# Patient Record
Sex: MALE | Race: WHITE | NOT HISPANIC OR LATINO | Employment: FULL TIME | URBAN - NONMETROPOLITAN AREA
[De-identification: names, ages, dates, MRNs, and addresses within clinical notes are randomized per-mention and may not be internally consistent; named-entity substitution may affect disease eponyms.]

---

## 2018-02-26 ENCOUNTER — DOCUMENTATION ONLY (OUTPATIENT)
Dept: FAMILY MEDICINE | Facility: OTHER | Age: 28
End: 2018-02-26

## 2018-02-26 PROBLEM — F41.1 ANXIETY STATE: Status: ACTIVE | Noted: 2018-02-26

## 2018-04-17 ENCOUNTER — OFFICE VISIT (OUTPATIENT)
Dept: FAMILY MEDICINE | Facility: OTHER | Age: 28
End: 2018-04-17
Attending: NURSE PRACTITIONER
Payer: COMMERCIAL

## 2018-04-17 VITALS
BODY MASS INDEX: 27.21 KG/M2 | SYSTOLIC BLOOD PRESSURE: 136 MMHG | HEIGHT: 67 IN | HEART RATE: 81 BPM | TEMPERATURE: 98.5 F | DIASTOLIC BLOOD PRESSURE: 80 MMHG | WEIGHT: 173.4 LBS

## 2018-04-17 DIAGNOSIS — K04.7 DENTAL INFECTION: Primary | ICD-10-CM

## 2018-04-17 PROCEDURE — 25000128 H RX IP 250 OP 636: Performed by: NURSE PRACTITIONER

## 2018-04-17 PROCEDURE — G0463 HOSPITAL OUTPT CLINIC VISIT: HCPCS | Mod: 25

## 2018-04-17 PROCEDURE — G0463 HOSPITAL OUTPT CLINIC VISIT: HCPCS

## 2018-04-17 PROCEDURE — 96372 THER/PROPH/DIAG INJ SC/IM: CPT

## 2018-04-17 PROCEDURE — 99213 OFFICE O/P EST LOW 20 MIN: CPT | Performed by: NURSE PRACTITIONER

## 2018-04-17 RX ORDER — KETOROLAC TROMETHAMINE 30 MG/ML
60 INJECTION, SOLUTION INTRAMUSCULAR; INTRAVENOUS ONCE
Status: COMPLETED | OUTPATIENT
Start: 2018-04-17 | End: 2018-04-17

## 2018-04-17 RX ORDER — PENICILLIN V POTASSIUM 500 MG/1
500 TABLET, FILM COATED ORAL 3 TIMES DAILY
Qty: 30 TABLET | Refills: 0 | Status: SHIPPED | OUTPATIENT
Start: 2018-04-17 | End: 2018-04-27

## 2018-04-17 RX ADMIN — KETOROLAC TROMETHAMINE 60 MG: 30 INJECTION, SOLUTION INTRAMUSCULAR at 13:08

## 2018-04-17 ASSESSMENT — PAIN SCALES - GENERAL: PAINLEVEL: EXTREME PAIN (8)

## 2018-04-17 ASSESSMENT — ENCOUNTER SYMPTOMS: FEVER: 0

## 2018-04-17 NOTE — PATIENT INSTRUCTIONS
"   *DENTAL PAIN    A crack or cavity in the tooth, which exposes the sensitive inner area of the tooth can cause tooth pain. An infection in the gum or the root of the tooth can cause pain and swelling. The pain is often made worse by drinking hot or cold fluids, or biting on hard foods. Pain may spread from the tooth to the ear or jaw on the same side.  HOME CARE:  1. Avoid hot and cold foods and liquids since your tooth may be sensitive to temperature changes.  2. If your tooth is chipped or cracked, or if there is a large open cavity, apply OIL OF CLOVES (available over-the-counter in drug stores) directly to the tooth to reduce pain. Some pharmacies carry an over-the-counter \"toothache kit.\" This contains a paste, which can be applied over the exposed tooth to decrease sensitivity. This is only a temporary solution. See a dentist as soon as possible to fix the tooth.  3. A cold pack on your jaw over the sore area may help reduce pain.  4. You may use acetaminophen (Tylenol) 650-1000 mg every 6 hours or ibuprofen (Motrin, Advil) 600 mg every 6-8 hours with food to control pain, if you are able to take these medicines. [ NOTE: If you have chronic liver or kidney disease or ever had a stomach ulcer or GI bleeding, talk with your doctor before using these medicines.]  5. If you have signs of an infection, an antibiotic will be given. Take it as directed.  FOLLOW-UP as directed with a dentist. Your pain may go away with the treatment given. However, only a dentist can fully evaluate and treat the cause and prevent the pain from coming back again.  TOOTHACHE IS A SIGN OF DISEASE IN YOUR TOOTH AND SHOULD BE EXAMINED AND TREATED BY A DENTIST.  GET PROMPT MEDICAL ATTENTION if any of the following occur:    Your face becomes swollen or red    Pain worsens or spreads to the neck    Fever over 101  F (38.3  C)    Unusual drowsiness; headache or stiff neck; weakness or fainting    Pus drains from the tooth    Difficulty " swallowing or breathing       8789-5464 [employer]. [address] [place] [address], [place] [address]. All rights reserved. This information is not intended as a substitute for professional medical care. Always follow your healthcare professional's instructions.  This information has been modified by your health care provider with permission from the publisher.

## 2018-04-17 NOTE — MR AVS SNAPSHOT
"              After Visit Summary   4/17/2018    Alexandr Guzmán    MRN: [id number]           Patient Information     Date Of Birth          1990        Visit Information        Provider Department      4/17/2018 12:15 PM Isabell Goodman APRN CNP [facility]        Today's Diagnoses     Dental infection    -  1      Care Instructions       *DENTAL PAIN    A crack or cavity in the tooth, which exposes the sensitive inner area of the tooth can cause tooth pain. An infection in the gum or the root of the tooth can cause pain and swelling. The pain is often made worse by drinking hot or cold fluids, or biting on hard foods. Pain may spread from the tooth to the ear or jaw on the same side.  HOME CARE:  1. Avoid hot and cold foods and liquids since your tooth may be sensitive to temperature changes.  2. If your tooth is chipped or cracked, or if there is a large open cavity, apply OIL OF CLOVES (available over-the-counter in drug stores) directly to the tooth to reduce pain. Some pharmacies carry an over-the-counter \"toothache kit.\" This contains a paste, which can be applied over the exposed tooth to decrease sensitivity. This is only a temporary solution. See a dentist as soon as possible to fix the tooth.  3. A cold pack on your jaw over the sore area may help reduce pain.  4. You may use acetaminophen (Tylenol) 650-1000 mg every 6 hours or ibuprofen (Motrin, Advil) 600 mg every 6-8 hours with food to control pain, if you are able to take these medicines. [ NOTE: If you have chronic liver or kidney disease or ever had a stomach ulcer or GI bleeding, talk with your doctor before using these medicines.]  5. If you have signs of an infection, an antibiotic will be given. Take it as directed.  FOLLOW-UP as directed with a dentist. Your pain may go away with the treatment given. However, only a dentist can fully evaluate and treat the cause and prevent the pain from coming back " "again.  TOOTHACHE IS A SIGN OF DISEASE IN YOUR TOOTH AND SHOULD BE EXAMINED AND TREATED BY A DENTIST.  GET PROMPT MEDICAL ATTENTION if any of the following occur:    Your face becomes swollen or red    Pain worsens or spreads to the neck    Fever over 101  F (38.3  C)    Unusual drowsiness; headache or stiff neck; weakness or fainting    Pus drains from the tooth    Difficulty swallowing or breathing       1410-3214 [employer]. [address] [place] [address], [place] [address]. All rights reserved. This information is not intended as a substitute for professional medical care. Always follow your healthcare professional's instructions.  This information has been modified by your health care provider with permission from the publisher.            Follow-ups after your visit        Who to contact     If you have questions or need follow up information about today's clinic visit or your schedule please contact [facility] directly at [phone].  Normal or non-critical lab and imaging results will be communicated to you by MyChart, letter or phone within 4 business days after the clinic has received the results. If you do not hear from us within 7 days, please contact the clinic through [facility] or phone. If you have a critical or abnormal lab result, we will notify you by phone as soon as possible.  Submit refill requests through [employer] or call your pharmacy and they will forward the refill request to us. Please allow 3 business days for your refill to be completed.          Additional Information About Your Visit        Care EveryWhere ID     This is your Care EveryWhere ID. This could be used by other organizations to access your [facility] medical records  [id number]        Your Vitals Were     Pulse Temperature Height BMI (Body Mass Index)          81 98.5  F (36.9  C) (Tympanic) 5' 6.93\" (1.7 m) 27.22 kg/m2         Blood Pressure from Last 3 Encounters:   04/17/18 136/80    Weight " from Last 3 Encounters:   04/17/18 173 lb 6.4 oz (78.7 kg)              Today, you had the following     No orders found for display         Today's Medication Changes          These changes are accurate as of 4/17/18  1:01 PM.  If you have any questions, ask your nurse or doctor.               Start taking these medicines.        Dose/Directions    penicillin V potassium 500 MG tablet   Commonly known as:  VEETID   Used for:  Dental infection   Started by:  Isabell Goodman APRN CNP        Dose:  500 mg   Take 1 tablet (500 mg) by mouth 3 times daily for 10 days   Quantity:  30 tablet   Refills:  0            Where to get your medicines      These medications were sent to [id number] - [place], [place] - [address], [facility] [address]     Phone:  [phone]     penicillin V potassium 500 MG tablet                Primary Care Provider Fax #    Physician No Ref-Primary [phone]       No address on file        Equal Access to Services     [facility] : Hadii sammy dobbins hadasho Soomaali, waaxda luqadaha, qaybta kaalmada adeegyada, waxay lily jenkins . So [facility] [phone].    ATENCIÓN: Si habla español, tiene a gay disposición servicios gratuitos de asistencia lingüística. Llame al [phone].    We comply with applicable federal civil rights laws and [place] laws. We do not discriminate on the basis of race, color, national origin, age, disability, sex, sexual orientation, or gender identity.            Thank you!     Thank you for choosing [facility]  for your care. Our goal is always to provide you with excellent care. Hearing back from our patients is one way we can continue to improve our services. Please take a few minutes to complete the written survey that you may receive in the mail after your visit with us. Thank you!             Your Updated Medication List - Protect others around  you: Learn how to safely use, store and throw away your medicines at www.disposemymeds.org.          This list is accurate as of 4/17/18  1:01 PM.  Always use your most recent med list.                   Brand Name Dispense Instructions for use Diagnosis    penicillin V potassium 500 MG tablet    VEETID    30 tablet    Take 1 tablet (500 mg) by mouth 3 times daily for 10 days    Dental infection

## 2018-04-17 NOTE — PROGRESS NOTES
HPI Comments: Nursing Notes:   Lavern Traylor LPN  4/17/2018 12:46 PM  Unsigned  Patient presents with top left molar for 1 week. Patient has appointment   on Thursday. Lavern Traylor LPN .............4/17/2018  12:45 PM    Has a tooth that needs to be removed and has an appointment with dentist in two days. Dentist suggested coming in here for antibiotics prior to appointment. Its the farthest back top molar on left side, swollen and painful-can't sleep a full night due to pain. Treating with Ibuprofen and Orajel. No fevers, last dose of Ibuprofen yesterday.     Dental Pain   Pertinent negatives include no fever.         Review of Systems   Constitutional: Negative for fever.   HENT:        Tooth and gum pain         Physical Exam   Constitutional: He is well-developed, well-nourished, and in no distress.   HENT:   Tooth #16 is fractured, obvious dental decay present, gum is swollen and tender to touch   Lymphadenopathy:     He has no cervical adenopathy.   Skin: Skin is warm and dry.   Psychiatric: Affect normal.     Assessment: on exam, well appearing male without fever, tooth#16 is fractured with obvious dental decay, gum is swollen and tender to touch    Diagnosis: Tooth Infection    Treat with PCN  mg TID 10 days  Toradol 60 mgs IM once in clinic-no Ibuprofen today  Tylenol prn  Follow up with dentist as scheduled

## 2018-04-17 NOTE — NURSING NOTE
Patient presents with top left molar for 1 week. Patient has appointment on Thursday. Lavern Traylor LPN .............4/17/2018  12:45 PM

## 2024-01-03 ENCOUNTER — APPOINTMENT (OUTPATIENT)
Dept: CT IMAGING | Facility: OTHER | Age: 34
End: 2024-01-03
Attending: PHYSICIAN ASSISTANT
Payer: COMMERCIAL

## 2024-01-03 ENCOUNTER — HOSPITAL ENCOUNTER (EMERGENCY)
Facility: OTHER | Age: 34
Discharge: HOME | End: 2024-01-03
Attending: PHYSICIAN ASSISTANT | Admitting: PHYSICIAN ASSISTANT
Payer: COMMERCIAL

## 2024-01-03 VITALS
HEIGHT: 67 IN | RESPIRATION RATE: 18 BRPM | TEMPERATURE: 98.3 F | BODY MASS INDEX: 20.62 KG/M2 | HEART RATE: 101 BPM | WEIGHT: 131.4 LBS | OXYGEN SATURATION: 100 % | SYSTOLIC BLOOD PRESSURE: 126 MMHG | DIASTOLIC BLOOD PRESSURE: 82 MMHG

## 2024-01-03 DIAGNOSIS — N20.1 URETERAL STONE: ICD-10-CM

## 2024-01-03 LAB
ALBUMIN UR-MCNC: 50 MG/DL
ANION GAP SERPL CALCULATED.3IONS-SCNC: 8 MMOL/L (ref 7–15)
APPEARANCE UR: ABNORMAL
BASOPHILS # BLD AUTO: 0 10E3/UL (ref 0–0.2)
BASOPHILS NFR BLD AUTO: 1 %
BILIRUB UR QL STRIP: NEGATIVE
BUN SERPL-MCNC: 18.7 MG/DL (ref 6–20)
CALCIUM SERPL-MCNC: 9.2 MG/DL (ref 8.6–10)
CHLORIDE SERPL-SCNC: 102 MMOL/L (ref 98–107)
COLOR UR AUTO: YELLOW
CREAT SERPL-MCNC: 1.02 MG/DL (ref 0.67–1.17)
DEPRECATED HCO3 PLAS-SCNC: 28 MMOL/L (ref 22–29)
EGFRCR SERPLBLD CKD-EPI 2021: >90 ML/MIN/1.73M2
EOSINOPHIL # BLD AUTO: 0.4 10E3/UL (ref 0–0.7)
EOSINOPHIL NFR BLD AUTO: 10 %
ERYTHROCYTE [DISTWIDTH] IN BLOOD BY AUTOMATED COUNT: 13.5 % (ref 10–15)
GLUCOSE SERPL-MCNC: 92 MG/DL (ref 70–99)
GLUCOSE UR STRIP-MCNC: NEGATIVE MG/DL
HCT VFR BLD AUTO: 38.5 % (ref 40–53)
HGB BLD-MCNC: 13.5 G/DL (ref 13.3–17.7)
HGB UR QL STRIP: ABNORMAL
IMM GRANULOCYTES # BLD: 0 10E3/UL
IMM GRANULOCYTES NFR BLD: 0 %
KETONES UR STRIP-MCNC: NEGATIVE MG/DL
LEUKOCYTE ESTERASE UR QL STRIP: NEGATIVE
LYMPHOCYTES # BLD AUTO: 0.5 10E3/UL (ref 0.8–5.3)
LYMPHOCYTES NFR BLD AUTO: 12 %
MCH RBC QN AUTO: 31.5 PG (ref 26.5–33)
MCHC RBC AUTO-ENTMCNC: 35.1 G/DL (ref 31.5–36.5)
MCV RBC AUTO: 90 FL (ref 78–100)
MONOCYTES # BLD AUTO: 0.7 10E3/UL (ref 0–1.3)
MONOCYTES NFR BLD AUTO: 17 %
MUCOUS THREADS #/AREA URNS LPF: PRESENT /LPF
NEUTROPHILS # BLD AUTO: 2.4 10E3/UL (ref 1.6–8.3)
NEUTROPHILS NFR BLD AUTO: 60 %
NITRATE UR QL: NEGATIVE
NRBC # BLD AUTO: 0 10E3/UL
NRBC BLD AUTO-RTO: 0 /100
PH UR STRIP: 6 [PH] (ref 5–9)
PLATELET # BLD AUTO: 154 10E3/UL (ref 150–450)
POTASSIUM SERPL-SCNC: 4.2 MMOL/L (ref 3.4–5.3)
RBC # BLD AUTO: 4.28 10E6/UL (ref 4.4–5.9)
RBC URINE: >182 /HPF
SODIUM SERPL-SCNC: 138 MMOL/L (ref 135–145)
SP GR UR STRIP: 1.02 (ref 1–1.03)
UROBILINOGEN UR STRIP-MCNC: NORMAL MG/DL
WBC # BLD AUTO: 4.1 10E3/UL (ref 4–11)
WBC URINE: 0 /HPF
YEAST #/AREA URNS HPF: ABNORMAL /HPF

## 2024-01-03 PROCEDURE — 99284 EMERGENCY DEPT VISIT MOD MDM: CPT | Mod: 25 | Performed by: PHYSICIAN ASSISTANT

## 2024-01-03 PROCEDURE — 80048 BASIC METABOLIC PNL TOTAL CA: CPT | Performed by: PHYSICIAN ASSISTANT

## 2024-01-03 PROCEDURE — 81001 URINALYSIS AUTO W/SCOPE: CPT | Performed by: PHYSICIAN ASSISTANT

## 2024-01-03 PROCEDURE — 74176 CT ABD & PELVIS W/O CONTRAST: CPT

## 2024-01-03 PROCEDURE — 99284 EMERGENCY DEPT VISIT MOD MDM: CPT | Performed by: PHYSICIAN ASSISTANT

## 2024-01-03 PROCEDURE — 85025 COMPLETE CBC W/AUTO DIFF WBC: CPT | Performed by: PHYSICIAN ASSISTANT

## 2024-01-03 PROCEDURE — 36415 COLL VENOUS BLD VENIPUNCTURE: CPT | Performed by: PHYSICIAN ASSISTANT

## 2024-01-03 RX ORDER — OXYCODONE HYDROCHLORIDE 5 MG/1
5 TABLET ORAL EVERY 6 HOURS PRN
Qty: 20 TABLET | Refills: 0 | Status: SHIPPED | OUTPATIENT
Start: 2024-01-03 | End: 2024-01-08

## 2024-01-03 RX ORDER — ONDANSETRON 4 MG/1
4 TABLET, ORALLY DISINTEGRATING ORAL EVERY 8 HOURS PRN
Qty: 5 TABLET | Refills: 0 | Status: SHIPPED | OUTPATIENT
Start: 2024-01-03

## 2024-01-03 RX ORDER — TAMSULOSIN HYDROCHLORIDE 0.4 MG/1
0.4 CAPSULE ORAL DAILY
Qty: 7 CAPSULE | Refills: 0 | Status: SHIPPED | OUTPATIENT
Start: 2024-01-03 | End: 2024-01-08

## 2024-01-03 ASSESSMENT — ACTIVITIES OF DAILY LIVING (ADL): ADLS_ACUITY_SCORE: 35

## 2024-01-03 ASSESSMENT — ENCOUNTER SYMPTOMS
FEVER: 0
CHILLS: 0
SHORTNESS OF BREATH: 0
HEMATURIA: 1
COUGH: 0
DYSURIA: 0
ABDOMINAL PAIN: 1

## 2024-01-04 ENCOUNTER — OFFICE VISIT (OUTPATIENT)
Dept: UROLOGY | Facility: OTHER | Age: 34
End: 2024-01-04
Attending: PHYSICIAN ASSISTANT
Payer: COMMERCIAL

## 2024-01-04 VITALS
WEIGHT: 131.6 LBS | SYSTOLIC BLOOD PRESSURE: 128 MMHG | HEART RATE: 94 BPM | OXYGEN SATURATION: 98 % | BODY MASS INDEX: 20.61 KG/M2 | DIASTOLIC BLOOD PRESSURE: 60 MMHG | RESPIRATION RATE: 16 BRPM

## 2024-01-04 DIAGNOSIS — N20.0 RIGHT RENAL STONE: Primary | ICD-10-CM

## 2024-01-04 DIAGNOSIS — N20.1 RIGHT URETERAL CALCULUS: ICD-10-CM

## 2024-01-04 DIAGNOSIS — N13.30 HYDRONEPHROSIS OF RIGHT KIDNEY: ICD-10-CM

## 2024-01-04 PROCEDURE — 99203 OFFICE O/P NEW LOW 30 MIN: CPT | Performed by: UROLOGY

## 2024-01-04 ASSESSMENT — PAIN SCALES - GENERAL: PAINLEVEL: MODERATE PAIN (5)

## 2024-01-04 NOTE — NURSING NOTE
"Chief Complaint   Patient presents with    Consult     Kidney stone      Patient presents to the clinic today for a consult for kidney stone    Review Of Systems  Skin: scaling, itching  Eyes: negative  Ears/Nose/Throat: negative  Respiratory: No shortness of breath, dyspnea on exertion, cough, or hemoptysis  Cardiovascular: negative  Gastrointestinal: negative  Genitourinary: negative  Musculoskeletal: negative  Neurologic: negative  Psychiatric: negative  Hematologic/Lymphatic/Immunologic: negative  Endocrine: negative    Initial There were no vitals taken for this visit. Estimated body mass index is 20.58 kg/m  as calculated from the following:    Height as of 1/3/24: 1.702 m (5' 7\").    Weight as of 1/3/24: 59.6 kg (131 lb 6.4 oz).  Meds Reconciled: complete      Chrissie Peterson LPN,LPN on 1/4/2024 at 3:11 PM  Ext. [phone]        Chrissie Peterson LPN  "

## 2024-01-04 NOTE — PROGRESS NOTES
Chief Complaint: No chief complaint on file.  Right distal obstructing ureteral stones    HPI: Mr. Alexandr Guzmán is a 33 year old year old male with a history of nephrolithiasis for which he has passed stones.  He has never required surgical treatment for kidney stone disease.  He presents today January 4, 2024 for evaluation of an 8 mm and 3 mm right distal obstructing ureteral stone with moderate hydronephrosis.  Seen in the ER yesterday for evaluation of hematuria and renal colic which had began the day before although he thinks he passed a kidney stone over [place].    No associated nausea vomiting fevers or chills.  Denied any significant urgency or frequency.    CT scan done demonstrating an 8 mm x 10mm and a 3.5 mm distal right obstructing stones with moderate hydronephrosis.  Patient was placed on tamsulosin and given medical therapy with oxycodone and antinausea medicine.    Although the patient had taken a pain medicine earlier today he was denying any significant pain at the time of today's visit.    No past medical history on file.    Past Surgical History:   Procedure Laterality Date    OTHER SURGICAL HISTORY      24021.0,PAST SURGICAL HISTORY,None       FAMILY HISTORY: Denies a family history of prostate cancer.      SOCIAL HISTORY:    reports that he has quit smoking. He has never used smokeless tobacco.    Current Outpatient Medications   Medication Sig Dispense Refill    ondansetron (ZOFRAN ODT) 4 MG ODT tab Take 1 tablet (4 mg) by mouth every 8 hours as needed for nausea 5 tablet 0    oxyCODONE (ROXICODONE) 5 MG tablet Take 1 tablet (5 mg) by mouth every 6 hours as needed for severe pain 20 tablet 0    tamsulosin (FLOMAX) 0.4 MG capsule Take 1 capsule (0.4 mg) by mouth daily 7 capsule 0       ALLERGIES: Sulfa antibiotics      REVIEW OF SYSTEMS:  Skin: scaling, itching  Eyes: negative  Ears/Nose/Throat: negative  Respiratory: No shortness of breath, dyspnea on exertion, cough, or  hemoptysis  Cardiovascular: negative  Gastrointestinal: negative  Genitourinary: negative  Musculoskeletal: negative  Neurologic: negative  Psychiatric: negative  Hematologic/Lymphatic/Immunologic: negative  Endocrine: negative  Chrissie Peterson LPN     GENERAL PHYSICAL EXAM:   Vitals: There were no vitals taken for this visit.  There is no height or weight on file to calculate BMI.    GENERAL: Well groomed, well developed, well nourished male in NAD.  Thin  HEENT:  Normal   CV:  Warm extremities   RESPIRATORY: Normal respiratory effort.    GI: Soft, NT, ND, mild right flank pain, mild RLQ pain  MS: Moving all 4  NEURO: Alert and oriented x 3.  PSYCH: Normal mood and affect, pleasant and agreeable during interview and exam.       RADIOLOGY: The following tests were reviewed:     CT ABDOMEN PELVIS W/O CONTRAST     Exam reason: hematuria. Pain to right side abdomen and left flank     Technique: Using helical CT technique, axial images of the abdomen and  pelvis  were acquired without administration of IV contrast. Coronal  and sagittal reformats were performed. This CT was performed using one  or more of the following dose reduction techniques: automated exposure  control, adjustment of the mA and/or kV according to patient size,  and/or use of iterative reconstruction technique.     Comparison: None.     FINDINGS:     NOTE: Noncontrast images are insensitive for detection of solid organ  abnormalities and some other types of pathology.     ABDOMEN:      Liver:  No focal mass.    Gallbladder:  No calcified gallstones.  Bile Ducts:  No significantly dilated ducts.  Spleen:  There is splenomegaly with the spleen measuring up to 16.0  cm.  Kidneys:  There is moderate right hydronephrosis and dilation of the  right ureter with an obstructing 8 mm calculus at the right UVJ. There  is also a 3 mm calculus within the distal right ureter. There are 2  nonobstructing calculi in the lower pole of the right kidney, the  larger  which measures up to 3 mm. No calculi within the left kidney.  No left hydronephrosis. No definite solid renal mass.  Adrenals:  No nodules.  Pancreas:  No mass or peripancreatic fat stranding.   Lymph Nodes:   No significant adenopathy.   Vascular:  No aortic aneurysm.   Abdominal wall:   No acute finding.     Pelvis: No mass or adenopathy.     Bowel/Mesentery/Peritoneum:   -No evidence of bowel obstruction.   -No acute inflammatory findings.   -No ascites.     Visualized portion of the Chest: No consolidation or pleural effusion.        Musculoskeletal: No acute osseous abnormality.                                                                       IMPRESSION:     There is an obstructing 8 mm calculus at the right UVJ with moderate  upstream right hydroureteronephrosis. There is also a 3 mm calculus  within the distal right ureter.     There are 2 nonobstructing calculi in the lower pole of the right  kidney.     EVELIO CHUA MD     LABS: The last test results for Ms. Alexandr Guzmán were reviewed.   Results for orders placed or performed during the hospital encounter of 01/03/24 (from the past 24 hour(s))   UA with Microscopic reflex to Culture    Specimen: Urine, Clean Catch   Result Value Ref Range    Color Urine Yellow Colorless, Straw, Light Yellow, Yellow    Appearance Urine Slightly Cloudy (A) Clear    Glucose Urine Negative Negative mg/dL    Bilirubin Urine Negative Negative    Ketones Urine Negative Negative mg/dL    Specific Gravity Urine 1.024 1.000 - 1.030    Blood Urine Large (A) Negative    pH Urine 6.0 5.0 - 9.0    Protein Albumin Urine 50 (A) Negative mg/dL    Urobilinogen Urine Normal Normal, 2.0 mg/dL    Nitrite Urine Negative Negative    Leukocyte Esterase Urine Negative Negative    Budding Yeast Urine Few (A) None Seen /HPF    Mucus Urine Present (A) None Seen /LPF    RBC Urine >182 (H) <=2 /HPF    WBC Urine 0 <=5 /HPF    Narrative    Urine Culture not indicated   CBC with platelets  differential    Narrative    The following orders were created for panel order CBC with platelets differential.  Procedure                               Abnormality         Status                     ---------                               -----------         ------                     CBC with platelets and d...[[id number]]  Abnormal            Final result                 Please view results for these tests on the individual orders.   Basic metabolic panel   Result Value Ref Range    Sodium 138 135 - 145 mmol/L    Potassium 4.2 3.4 - 5.3 mmol/L    Chloride 102 98 - 107 mmol/L    Carbon Dioxide (CO2) 28 22 - 29 mmol/L    Anion Gap 8 7 - 15 mmol/L    Urea Nitrogen 18.7 6.0 - 20.0 mg/dL    Creatinine 1.02 0.67 - 1.17 mg/dL    GFR Estimate >90 >60 mL/min/1.73m2    Calcium 9.2 8.6 - 10.0 mg/dL    Glucose 92 70 - 99 mg/dL   CBC with platelets and differential   Result Value Ref Range    WBC Count 4.1 4.0 - 11.0 10e3/uL    RBC Count 4.28 (L) 4.40 - 5.90 10e6/uL    Hemoglobin 13.5 13.3 - 17.7 g/dL    Hematocrit 38.5 (L) 40.0 - 53.0 %    MCV 90 78 - 100 fL    MCH 31.5 26.5 - 33.0 pg    MCHC 35.1 31.5 - 36.5 g/dL    RDW 13.5 10.0 - 15.0 %    Platelet Count 154 150 - 450 10e3/uL    % Neutrophils 60 %    % Lymphocytes 12 %    % Monocytes 17 %    % Eosinophils 10 %    % Basophils 1 %    % Immature Granulocytes 0 %    NRBCs per 100 WBC 0 <1 /100    Absolute Neutrophils 2.4 1.6 - 8.3 10e3/uL    Absolute Lymphocytes 0.5 (L) 0.8 - 5.3 10e3/uL    Absolute Monocytes 0.7 0.0 - 1.3 10e3/uL    Absolute Eosinophils 0.4 0.0 - 0.7 10e3/uL    Absolute Basophils 0.0 0.0 - 0.2 10e3/uL    Absolute Immature Granulocytes 0.0 <=0.4 10e3/uL    Absolute NRBCs 0.0 10e3/uL   CT Abdomen Pelvis w/o Contrast    Narrative    Exam: CT ABDOMEN PELVIS W/O CONTRAST    Exam reason: hematuria. Pain to right side abdomen and left flank    Technique: Using helical CT technique, axial images of the abdomen and  pelvis  were acquired without administration of IV  contrast. Coronal  and sagittal reformats were performed. This CT was performed using one  or more of the following dose reduction techniques: automated exposure  control, adjustment of the mA and/or kV according to patient size,  and/or use of iterative reconstruction technique.    Comparison: None.    FINDINGS:    NOTE: Noncontrast images are insensitive for detection of solid organ  abnormalities and some other types of pathology.    ABDOMEN:     Liver:  No focal mass.    Gallbladder:  No calcified gallstones.  Bile Ducts:  No significantly dilated ducts.  Spleen:  There is splenomegaly with the spleen measuring up to 16.0  cm.  Kidneys:  There is moderate right hydronephrosis and dilation of the  right ureter with an obstructing 8 mm calculus at the right UVJ. There  is also a 3 mm calculus within the distal right ureter. There are 2  nonobstructing calculi in the lower pole of the right kidney, the  larger which measures up to 3 mm. No calculi within the left kidney.  No left hydronephrosis. No definite solid renal mass.  Adrenals:  No nodules.  Pancreas:  No mass or peripancreatic fat stranding.   Lymph Nodes:   No significant adenopathy.   Vascular:  No aortic aneurysm.   Abdominal wall:   No acute finding.    Pelvis: No mass or adenopathy.    Bowel/Mesentery/Peritoneum:   -No evidence of bowel obstruction.   -No acute inflammatory findings.   -No ascites.    Visualized portion of the Chest: No consolidation or pleural effusion.      Musculoskeletal: No acute osseous abnormality.       Impression    IMPRESSION:    There is an obstructing 8 mm calculus at the right UVJ with moderate  upstream right hydroureteronephrosis. There is also a 3 mm calculus  within the distal right ureter.    There are 2 nonobstructing calculi in the lower pole of the right  kidney.    EVELIO CHUA MD         SYSTEM ID:  [id number]     BMP -   Recent Labs   Lab Test 01/03/24 2005      POTASSIUM 4.2   CHLORIDE 102   CO2 28    BUN 18.7   CR 1.02   GLC 92   LAILA 9.2       CBC -   Recent Labs   Lab Test 24   WBC 4.1   HGB 13.5          ASSESSMENT:   Right distal obstructing ureteral stones  Gross hematuria  Right hydronephrosis  Right renal stones     PLAN:   Patient with an 8 mm and 3 mm right distal obstructing ureteral stones.  There is moderate hydronephrosis.  No evidence of UTI and his renal function and CBC are within normal limits.    Imaging reviewed and all labs.  I give him a 10-20% chance of passing this large stone given its location and size.    Treatment options discussed includin. Watchful waiting with medical expulsion therapy using tamsulosin and urine straining.  He is on these.    2. Ureteroscopy with laser lithotripsy, basketing of stones and stent placement.  Risks of bleeding, infection, ureteral and urethral injury/stricture, failure with the need for a 2nd stage procedure and irritation with urgency/frequency/bleeding from the stent.     3. ESWL or shock wave lithotripsy. Risks discussed including bleeding, infection, hematoma formation around kidney, failure, stone obstruction, bowel injury and long term risks of DM, HTN, CAD and CKD.  Not a candidate for ESWL given the distal location of the stones.    Risks and benefits of each discussed. Risks and symptoms from stent placement discussed.    Patient has agreed to watchful waiting with follow up in 7 days for recheck.  Asked patient to strain urine and drink plenty of fluids.  This is despite my recommendation for surgery sooner than later.    Stone prevention discussed includin. Decreased salt intake:  No more than 2000 mg/day    2. Decreased animal protein including chicken, beef, pork, fish and wild game.     3. Increased fluid consumption enough to maintain > 2 Liters urine output/day.     4. Normal calcium diet and intake.     5. Increased fruits and vegetables.    Stone work up discussed including 24-hour urine, renal panel  (calcium), PTH , Phorphorus and uric acid level.    All questions addressed.      35 minutes spent on the date of this encounter doing chart review, history and exam, documentation and further activities as noted above.      Ramón Alfaro MD   [facility]

## 2024-01-04 NOTE — PATIENT INSTRUCTIONS
Continue tamsulosin daily  Avoid constipation.  Use Miralax 1 capful in 8 oz water twice a day unless you get diarrhea then just once a day or every other day.  Drink plenty of fluids  Limit your advil, motrin, ibuprofen, aleve type of medicines as these can interfere with kidney function.  Strain your urine.  Keep any stone you pass  Call with fever, chills, severe pain or concerns.

## 2024-01-04 NOTE — DISCHARGE INSTRUCTIONS
Get plenty of fluids and rest.  As discussed, you do have 2 stones on your right side.  There is one that is 8 mm which is very large and may have difficulty passing on its own.  You can alternate Tylenol ibuprofen every 4 hours, take pain medication as needed.  I also sent you home with some Zofran medication and medication called Flomax which may help relax your ureters and allow stones to pass more easily.  I did place referral for you to follow-up with urology to discuss possible removal.  You should return the ED if you are developing intractable pain, nausea or vomiting increased fevers or difficulty passing urine.

## 2024-01-04 NOTE — ED TRIAGE NOTES
Pt came in by private vehicle with a co blood in urine. Pt states that it started today. Pt states that he passed a kidney stone on Christmas. Pt. Denies pain, burning, frequency with urination.     Temp: 98.3  F (36.8  C) Temp src: Tympanic BP: 126/82 Pulse: 101   Resp: 18 SpO2: 100 % O2 Device: None (Room air)           Triage Assessment (Adult)       Row Name 01/03/24 4610          Triage Assessment    Airway WDL WDL        Respiratory WDL    Respiratory WDL WDL        Skin Circulation/Temperature WDL    Skin Circulation/Temperature WDL WDL        Cardiac WDL    Cardiac WDL WDL        Peripheral/Neurovascular WDL    Peripheral Neurovascular WDL WDL        Cognitive/Neuro/Behavioral WDL    Cognitive/Neuro/Behavioral WDL WDL

## 2024-01-04 NOTE — ED PROVIDER NOTES
"  History     Chief Complaint   Patient presents with    Hematuria     HPI  Alexandr Guzmán is a 33 year old male who presents to the ED for evaluation of hematuria. Pt came in by private vehicle with a co blood in urine. Pt states that it started today. Pt states that he passed a kidney stone on Koby. Pt. Denies pain, burning, frequency with urination.      Allergies:  Allergies   Allergen Reactions    Sulfa Antibiotics Unknown       Problem List:    There are no problems to display for this patient.       Past Medical History:    No past medical history on file.    Past Surgical History:    No past surgical history on file.    Family History:    No family history on file.    Social History:  Marital Status:  Single [1]  Social History     Tobacco Use    Smoking status: Former    Smokeless tobacco: Never   Substance Use Topics    Alcohol use: Yes     Comment: occasional    Drug use: No        Medications:    ondansetron (ZOFRAN ODT) 4 MG ODT tab  oxyCODONE (ROXICODONE) 5 MG tablet  tamsulosin (FLOMAX) 0.4 MG capsule          Review of Systems   Constitutional:  Negative for chills and fever.   HENT:  Negative for congestion.    Eyes:  Negative for visual disturbance.   Respiratory:  Negative for cough and shortness of breath.    Cardiovascular:  Negative for chest pain.   Gastrointestinal:  Positive for abdominal pain.   Genitourinary:  Positive for hematuria. Negative for dysuria, scrotal swelling and testicular pain.   Allergic/Immunologic: Negative for immunocompromised state.   Neurological:  Negative for syncope.       Physical Exam   BP: 126/82  Pulse: 101  Temp: 98.3  F (36.8  C)  Resp: 18  Height: 170.2 cm (5' 7\")  Weight: 59.6 kg (131 lb 6.4 oz)  SpO2: 100 %      Physical Exam  Constitutional:       General: He is not in acute distress.     Appearance: He is well-developed. He is not diaphoretic.   HENT:      Head: Normocephalic and atraumatic.   Eyes:      General: No scleral icterus.     " Conjunctiva/sclera: Conjunctivae normal.   Cardiovascular:      Rate and Rhythm: Normal rate and regular rhythm.   Pulmonary:      Effort: Pulmonary effort is normal.      Breath sounds: Normal breath sounds.   Abdominal:      Palpations: Abdomen is soft.      Tenderness: There is abdominal tenderness. There is no guarding or rebound.      Comments: Right sided abdominal ttp. Left flank ttp.    Musculoskeletal:         General: No deformity.      Cervical back: Neck supple.   Lymphadenopathy:      Cervical: No cervical adenopathy.   Skin:     General: Skin is warm and dry.      Coloration: Skin is not jaundiced.      Findings: No rash.   Neurological:      General: No focal deficit present.      Mental Status: He is alert and oriented to person, place, and time. Mental status is at baseline.   Psychiatric:         Mood and Affect: Mood normal.         Behavior: Behavior normal.         Thought Content: Thought content normal.         Judgment: Judgment normal.         ED Course                 Procedures              Critical Care time:  none               Results for orders placed or performed during the hospital encounter of 01/03/24 (from the past 24 hour(s))   UA with Microscopic reflex to Culture    Specimen: Urine, Clean Catch   Result Value Ref Range    Color Urine Yellow Colorless, Straw, Light Yellow, Yellow    Appearance Urine Slightly Cloudy (A) Clear    Glucose Urine Negative Negative mg/dL    Bilirubin Urine Negative Negative    Ketones Urine Negative Negative mg/dL    Specific Gravity Urine 1.024 1.000 - 1.030    Blood Urine Large (A) Negative    pH Urine 6.0 5.0 - 9.0    Protein Albumin Urine 50 (A) Negative mg/dL    Urobilinogen Urine Normal Normal, 2.0 mg/dL    Nitrite Urine Negative Negative    Leukocyte Esterase Urine Negative Negative    Budding Yeast Urine Few (A) None Seen /HPF    Mucus Urine Present (A) None Seen /LPF    RBC Urine >182 (H) <=2 /HPF    WBC Urine 0 <=5 /HPF    Narrative    Urine  Culture not indicated   CBC with platelets differential    Narrative    The following orders were created for panel order CBC with platelets differential.  Procedure                               Abnormality         Status                     ---------                               -----------         ------                     CBC with platelets and d...[[id number]]  Abnormal            Final result                 Please view results for these tests on the individual orders.   Basic metabolic panel   Result Value Ref Range    Sodium 138 135 - 145 mmol/L    Potassium 4.2 3.4 - 5.3 mmol/L    Chloride 102 98 - 107 mmol/L    Carbon Dioxide (CO2) 28 22 - 29 mmol/L    Anion Gap 8 7 - 15 mmol/L    Urea Nitrogen 18.7 6.0 - 20.0 mg/dL    Creatinine 1.02 0.67 - 1.17 mg/dL    GFR Estimate >90 >60 mL/min/1.73m2    Calcium 9.2 8.6 - 10.0 mg/dL    Glucose 92 70 - 99 mg/dL   CBC with platelets and differential   Result Value Ref Range    WBC Count 4.1 4.0 - 11.0 10e3/uL    RBC Count 4.28 (L) 4.40 - 5.90 10e6/uL    Hemoglobin 13.5 13.3 - 17.7 g/dL    Hematocrit 38.5 (L) 40.0 - 53.0 %    MCV 90 78 - 100 fL    MCH 31.5 26.5 - 33.0 pg    MCHC 35.1 31.5 - 36.5 g/dL    RDW 13.5 10.0 - 15.0 %    Platelet Count 154 150 - 450 10e3/uL    % Neutrophils 60 %    % Lymphocytes 12 %    % Monocytes 17 %    % Eosinophils 10 %    % Basophils 1 %    % Immature Granulocytes 0 %    NRBCs per 100 WBC 0 <1 /100    Absolute Neutrophils 2.4 1.6 - 8.3 10e3/uL    Absolute Lymphocytes 0.5 (L) 0.8 - 5.3 10e3/uL    Absolute Monocytes 0.7 0.0 - 1.3 10e3/uL    Absolute Eosinophils 0.4 0.0 - 0.7 10e3/uL    Absolute Basophils 0.0 0.0 - 0.2 10e3/uL    Absolute Immature Granulocytes 0.0 <=0.4 10e3/uL    Absolute NRBCs 0.0 10e3/uL   CT Abdomen Pelvis w/o Contrast    Narrative    Exam: CT ABDOMEN PELVIS W/O CONTRAST    Exam reason: hematuria. Pain to right side abdomen and left flank    Technique: Using helical CT technique, axial images of the abdomen and  pelvis   were acquired without administration of IV contrast. Coronal  and sagittal reformats were performed. This CT was performed using one  or more of the following dose reduction techniques: automated exposure  control, adjustment of the mA and/or kV according to patient size,  and/or use of iterative reconstruction technique.    Comparison: None.    FINDINGS:    NOTE: Noncontrast images are insensitive for detection of solid organ  abnormalities and some other types of pathology.    ABDOMEN:     Liver:  No focal mass.    Gallbladder:  No calcified gallstones.  Bile Ducts:  No significantly dilated ducts.  Spleen:  There is splenomegaly with the spleen measuring up to 16.0  cm.  Kidneys:  There is moderate right hydronephrosis and dilation of the  right ureter with an obstructing 8 mm calculus at the right UVJ. There  is also a 3 mm calculus within the distal right ureter. There are 2  nonobstructing calculi in the lower pole of the right kidney, the  larger which measures up to 3 mm. No calculi within the left kidney.  No left hydronephrosis. No definite solid renal mass.  Adrenals:  No nodules.  Pancreas:  No mass or peripancreatic fat stranding.   Lymph Nodes:   No significant adenopathy.   Vascular:  No aortic aneurysm.   Abdominal wall:   No acute finding.    Pelvis: No mass or adenopathy.    Bowel/Mesentery/Peritoneum:   -No evidence of bowel obstruction.   -No acute inflammatory findings.   -No ascites.    Visualized portion of the Chest: No consolidation or pleural effusion.      Musculoskeletal: No acute osseous abnormality.       Impression    IMPRESSION:    There is an obstructing 8 mm calculus at the right UVJ with moderate  upstream right hydroureteronephrosis. There is also a 3 mm calculus  within the distal right ureter.    There are 2 nonobstructing calculi in the lower pole of the right  kidney.    EVELIO CHUA MD         SYSTEM ID:  [id number]       Medications - No data to display    Assessments &  Plan (with Medical Decision Making)   Nontoxic no acute distress.  Heart, lung, bowel sounds are normal.  Abdomen is soft with some tenderness to palpation in the right side.  He also has some very mild left flank tenderness to palpation.  Vital signs are stable and he is afebrile.    He has no leukocytosis, normal hemoglobin, BMP remarkable for normal kidney function.  Urinalysis shows large blood but no signs of infection.    CT abdomen:  -There is an obstructing 8 mm calculus at the right UVJ with moderate  upstream right hydroureteronephrosis. There is also a 3 mm calculus  within the distal right ureter.     -There are 2 nonobstructing calculi in the lower pole of the right  kidney.    Overall he seems to be doing very well, his pain and nausea are well-controlled.  He has no signs of an infected stone and he has normal kidney functions.  However I discussed with him that due to the location and the large 8 mm stone that this has a low likelihood of passing on its own.  Will send him home with some pain medication to take as needed, Zofran, Flomax and referral was placed to follow-up with urology for close reassessment.    Strict return precautions are given to the pt, they will return if symptoms are worsening or concerning. The pt understands and agrees with the plan and they are discharged.     Hipolito Monroy PA-C    I have reviewed the nursing notes.    I have reviewed the findings, diagnosis, plan and need for follow up with the patient.          Discharge Medication List as of 1/3/2024  9:20 PM        START taking these medications    Details   ondansetron (ZOFRAN ODT) 4 MG ODT tab Take 1 tablet (4 mg) by mouth every 8 hours as needed for nausea, Disp-5 tablet, R-0, [facility]      oxyCODONE (ROXICODONE) 5 MG tablet Take 1 tablet (5 mg) by mouth every 6 hours as needed for severe pain, Disp-20 tablet, R-0, [facility]      tamsulosin (FLOMAX) 0.4 MG capsule Take 1 capsule (0.4 mg) by mouth daily, Disp-7  capsule, R-0, [facility]             Final diagnoses:   Ureteral stone       1/3/2024   [facility]       Hipolito Monroy PA  01/03/24 [id number]

## 2024-01-08 ENCOUNTER — OFFICE VISIT (OUTPATIENT)
Dept: FAMILY MEDICINE | Facility: OTHER | Age: 34
End: 2024-01-08
Payer: COMMERCIAL

## 2024-01-08 VITALS
HEART RATE: 93 BPM | HEIGHT: 67 IN | OXYGEN SATURATION: 98 % | BODY MASS INDEX: 21.39 KG/M2 | WEIGHT: 136.3 LBS | RESPIRATION RATE: 20 BRPM | SYSTOLIC BLOOD PRESSURE: 118 MMHG | TEMPERATURE: 98 F | DIASTOLIC BLOOD PRESSURE: 86 MMHG

## 2024-01-08 DIAGNOSIS — N20.1 RIGHT URETERAL CALCULUS: ICD-10-CM

## 2024-01-08 DIAGNOSIS — N13.30 HYDRONEPHROSIS OF RIGHT KIDNEY: ICD-10-CM

## 2024-01-08 DIAGNOSIS — N20.0 RIGHT RENAL STONE: Primary | ICD-10-CM

## 2024-01-08 PROCEDURE — 99203 OFFICE O/P NEW LOW 30 MIN: CPT

## 2024-01-08 RX ORDER — OXYCODONE HYDROCHLORIDE 5 MG/1
5 TABLET ORAL EVERY 6 HOURS PRN
Qty: 20 TABLET | Refills: 0 | Status: ON HOLD | OUTPATIENT
Start: 2024-01-08 | End: 2024-01-22

## 2024-01-08 RX ORDER — TAMSULOSIN HYDROCHLORIDE 0.4 MG/1
0.4 CAPSULE ORAL DAILY
Qty: 7 CAPSULE | Refills: 0 | Status: ON HOLD | OUTPATIENT
Start: 2024-01-08 | End: 2024-02-20

## 2024-01-08 ASSESSMENT — PAIN SCALES - GENERAL: PAINLEVEL: SEVERE PAIN (6)

## 2024-01-08 NOTE — PROGRESS NOTES
ASSESSMENT/PLAN:    I have reviewed the nursing notes.  I have reviewed the findings, diagnosis, plan and need for follow up with the patient.    1. Right renal stone  2. Right ureteral calculus  3. Hydronephrosis of right kidney  - tamsulosin (FLOMAX) 0.4 MG capsule; Take 1 capsule (0.4 mg) by mouth daily  Dispense: 7 capsule; Refill: 0  - oxyCODONE (ROXICODONE) 5 MG tablet; Take 1 tablet (5 mg) by mouth every 6 hours as needed for severe pain  Dispense: 20 tablet; Refill: 0    Patient presents with persistent pain and discomfort due to a right renal stone and hydronephrosis of his right kidney.  Patient has not yet passed the kidney stone with use of Flomax.  Patient is stable and is still able to urinate without difficulty. He is not currently having fever, chills, nausea/vomiting, or hematuria.  Refilled patient's Flomax and oxycodone but advised that he needs to follow-up with Dr. Alfaro next week as scheduled.  Advised patient that he can also take Tylenol and ibuprofen as needed.  Continue to push fluids.    Discussed warning signs/symptoms indicative of need to f/u    Follow up if symptoms persist or worsen or concerns    I explained my diagnostic considerations and recommendations to the patient, who voiced understanding and agreement with the treatment plan. All questions were answered. We discussed potential side effects of any prescribed or recommended therapies, as well as expectations for response to treatments.    DEYANIRA Vail CNP  1/8/2024  1:13 PM    HPI:    Alexandr Guzmán is a 33 year old male who presents to [facility] today for concerns of kidney stone    Patient has a history of recurrent kidney stones.  He was seen in the ED on 1/3/2024 and was diagnosed with an obstructing right kidney stone measuring 8 mm with moderate upstream right hydroureteronephrosis.  Patient also has a 3 mm calculus within the distal right ureter.  Patient was prescribed Flomax and oxycodone. Patient was  then seen by Dr. Alfaro in urology on 1/4/2024 for follow-up.  The plan was for patient to continue watchful waiting with medical expulsion therapy.  Patient states that he has not yet passed the stone and his pain has been consistent at a 6 out of 10.  He denies any current hematuria, fever, chills, or nausea/vomiting.  Patient states that he has been drinking a gallon of water per day and has been taking the Flomax and oxycodone as prescribed.  He has also been taking Aleve to help with the pain.  He states that he is still able to urinate without difficulty.  Patient has a follow-up with Dr. Alfaro next week but will run out of the Flomax and oxycodone tomorrow.  Patient is wanting to continue on the Flomax to try and pass the stone and is requesting a refill of the oxycodone due to the persistent pain from the kidney stone.    No past medical history on file.  Past Surgical History:   Procedure Laterality Date    OTHER SURGICAL HISTORY      39566.0,PAST SURGICAL HISTORY,None     Social History     Tobacco Use    Smoking status: Some Days     Types: Cigarettes    Smokeless tobacco: Never   Substance Use Topics    Alcohol use: Yes     Comment: occasional     Current Outpatient Medications   Medication Sig Dispense Refill    oxyCODONE (ROXICODONE) 5 MG tablet Take 1 tablet (5 mg) by mouth every 6 hours as needed for severe pain 20 tablet 0    tamsulosin (FLOMAX) 0.4 MG capsule Take 1 capsule (0.4 mg) by mouth daily 7 capsule 0    ondansetron (ZOFRAN ODT) 4 MG ODT tab Take 1 tablet (4 mg) by mouth every 8 hours as needed for nausea (Patient not taking: Reported on 1/8/2024) 5 tablet 0     Allergies   Allergen Reactions    Sulfa Antibiotics Unknown     Past medical history, past surgical history, current medications and allergies reviewed and accurate to the best of my knowledge.      ROS:  Refer to HPI    /86 (BP Location: Right arm, Patient Position: Sitting, Cuff Size: Adult Regular)   Pulse 93   Temp 98  F  "(36.7  C) (Tympanic)   Resp 20   Ht 1.702 m (5' 7\")   Wt 61.8 kg (136 lb 4.8 oz)   SpO2 98%   BMI 21.35 kg/m      EXAM:  General Appearance: Well appearing 33 year old male, appropriate appearance for age. No acute distress   Respiratory: normal chest wall and respirations.  Normal effort.  Clear to auscultation bilaterally, no wheezing, crackles or rhonchi.  No increased work of breathing.  No cough appreciated.  Cardiac: RRR with no murmurs  :  No suprapubic tenderness to palpation.  Absent CVA tenderness to palpation.    Musculoskeletal:  Equal movement of bilateral upper extremities.  Equal movement of bilateral lower extremities.  Normal gait.    Dermatological: no rashes noted of exposed skin  Neuro: Alert and oriented to person, place, and time.    Psychological: normal affect, alert, oriented, and pleasant.       "

## 2024-01-08 NOTE — NURSING NOTE
"Pt presents to  for kidney stone problems. Pt saw Dr. Alfaro on 1/4/24 for stone found in ER on 1/3/24 at 8mm x 10mm. Pt was placed on oxycodone and flomax. Pt asking for refill due to urology not being back in office until next Monday.  Pt took oxycodone and Aleve at 0530.    Chief Complaint   Patient presents with    Kidney Stone Related       FOOD SECURITY SCREENING QUESTIONS  Hunger Vital Signs:  Within the past 12 months we worried whether our food would run out before we got money to buy more. Never  Within the past 12 months the food we bought just didn't last and we didn't have money to get more. Never  Brittany Dearmon 1/8/2024 1:09 PM      Initial /86 (BP Location: Right arm, Patient Position: Sitting, Cuff Size: Adult Regular)   Pulse 93   Temp 98  F (36.7  C) (Tympanic)   Resp 20   Ht 1.702 m (5' 7\")   Wt 61.8 kg (136 lb 4.8 oz)   SpO2 98%   BMI 21.35 kg/m   Estimated body mass index is 21.35 kg/m  as calculated from the following:    Height as of this encounter: 1.702 m (5' 7\").    Weight as of this encounter: 61.8 kg (136 lb 4.8 oz).  Medication Reconciliation: complete    Brittany Dearonaldon    "

## 2024-01-10 DIAGNOSIS — Z85.51 PERSONAL HISTORY OF MALIGNANT NEOPLASM OF BLADDER: Primary | ICD-10-CM

## 2024-01-15 ENCOUNTER — LAB (OUTPATIENT)
Dept: LAB | Facility: OTHER | Age: 34
End: 2024-01-15
Attending: UROLOGY
Payer: COMMERCIAL

## 2024-01-15 ENCOUNTER — OFFICE VISIT (OUTPATIENT)
Dept: UROLOGY | Facility: OTHER | Age: 34
End: 2024-01-15
Attending: UROLOGY
Payer: COMMERCIAL

## 2024-01-15 ENCOUNTER — HOSPITAL ENCOUNTER (OUTPATIENT)
Dept: GENERAL RADIOLOGY | Facility: OTHER | Age: 34
Discharge: HOME | End: 2024-01-15
Attending: UROLOGY
Payer: COMMERCIAL

## 2024-01-15 VITALS
RESPIRATION RATE: 20 BRPM | TEMPERATURE: 97.7 F | SYSTOLIC BLOOD PRESSURE: 124 MMHG | HEIGHT: 67 IN | HEART RATE: 94 BPM | DIASTOLIC BLOOD PRESSURE: 76 MMHG | OXYGEN SATURATION: 97 % | WEIGHT: 130 LBS | BODY MASS INDEX: 20.4 KG/M2

## 2024-01-15 DIAGNOSIS — N20.1 RIGHT URETERAL CALCULUS: Primary | ICD-10-CM

## 2024-01-15 DIAGNOSIS — Z85.51 PERSONAL HISTORY OF MALIGNANT NEOPLASM OF BLADDER: ICD-10-CM

## 2024-01-15 DIAGNOSIS — N20.1 RIGHT URETERAL CALCULUS: ICD-10-CM

## 2024-01-15 LAB
ALBUMIN UR-MCNC: NEGATIVE MG/DL
ANION GAP SERPL CALCULATED.3IONS-SCNC: 8 MMOL/L (ref 7–15)
APPEARANCE UR: CLEAR
BILIRUB UR QL STRIP: NEGATIVE
BUN SERPL-MCNC: 18.5 MG/DL (ref 6–20)
CALCIUM SERPL-MCNC: 9.3 MG/DL (ref 8.6–10)
CHLORIDE SERPL-SCNC: 99 MMOL/L (ref 98–107)
COLOR UR AUTO: YELLOW
CREAT SERPL-MCNC: 1.09 MG/DL (ref 0.67–1.17)
DEPRECATED HCO3 PLAS-SCNC: 30 MMOL/L (ref 22–29)
EGFRCR SERPLBLD CKD-EPI 2021: >90 ML/MIN/1.73M2
GLUCOSE SERPL-MCNC: 86 MG/DL (ref 70–99)
GLUCOSE UR STRIP-MCNC: NEGATIVE MG/DL
HGB UR QL STRIP: ABNORMAL
KETONES UR STRIP-MCNC: NEGATIVE MG/DL
LEUKOCYTE ESTERASE UR QL STRIP: ABNORMAL
NITRATE UR QL: NEGATIVE
PH UR STRIP: 5 [PH] (ref 5–9)
POTASSIUM SERPL-SCNC: 4.3 MMOL/L (ref 3.4–5.3)
SODIUM SERPL-SCNC: 137 MMOL/L (ref 135–145)
SP GR UR STRIP: 1.01 (ref 1–1.03)
UROBILINOGEN UR STRIP-MCNC: NORMAL MG/DL

## 2024-01-15 PROCEDURE — 36415 COLL VENOUS BLD VENIPUNCTURE: CPT | Mod: ZL

## 2024-01-15 PROCEDURE — 74018 RADEX ABDOMEN 1 VIEW: CPT

## 2024-01-15 PROCEDURE — 81003 URINALYSIS AUTO W/O SCOPE: CPT | Mod: ZL

## 2024-01-15 PROCEDURE — 80048 BASIC METABOLIC PNL TOTAL CA: CPT | Mod: ZL

## 2024-01-15 PROCEDURE — 99213 OFFICE O/P EST LOW 20 MIN: CPT | Performed by: UROLOGY

## 2024-01-15 ASSESSMENT — PAIN SCALES - GENERAL: PAINLEVEL: SEVERE PAIN (6)

## 2024-01-15 NOTE — PROGRESS NOTES
"Chief Complaint: Follow Up (Kidney stone )  .    HPI: Mr. Alexandr Guzmán is a 33 year old year old male presenting today January 15, 2024 in follow up for evaluation of a right distal 1cm obstructing stone.    Had blood with moderate pain over the weekend.  No fever/chills.    Continues to work.  Remains on tamsulosin.  Continues to drink 1 gallon water/day.     KUB with persistence of the stone today.     No past medical history on file.    Past Surgical History:   Procedure Laterality Date    OTHER SURGICAL HISTORY      96867.0,PAST SURGICAL HISTORY,None       Current Outpatient Medications   Medication Sig Dispense Refill    oxyCODONE (ROXICODONE) 5 MG tablet Take 1 tablet (5 mg) by mouth every 6 hours as needed for severe pain 20 tablet 0    tamsulosin (FLOMAX) 0.4 MG capsule Take 1 capsule (0.4 mg) by mouth daily 7 capsule 0    ondansetron (ZOFRAN ODT) 4 MG ODT tab Take 1 tablet (4 mg) by mouth every 8 hours as needed for nausea (Patient not taking: Reported on 1/8/2024) 5 tablet 0       ALLERGIES: Sulfa antibiotics      REVIEW OF SYSTEMS:  Skin: Skin: negative  Eyes: negative  Ears/Nose/Throat: negative  Respiratory: No shortness of breath, dyspnea on exertion, cough, or hemoptysis  Cardiovascular: negative  Gastrointestinal: negative  Genitourinary: kidney stone  Musculoskeletal: negative  Neurologic: negative  Psychiatric: negative  Hematologic/Lymphatic/Immunologic: negative  Endocrine: negative      GENERAL PHYSICAL EXAM:   Vitals: /76   Pulse 94   Temp 97.7  F (36.5  C) (Tympanic)   Resp 20   Ht 1.702 m (5' 7\")   Wt 59 kg (130 lb)   SpO2 97%   BMI 20.36 kg/m    Body mass index is 20.36 kg/m .    GENERAL: Well groomed, well developed, well nourished male in NAD.  ENT:  ENT exam normal  CV:  Warm extremities   RESPIRATORY: Normal respiratory effort.   GI:  Soft, NT, ND, mild flank pain , moderate LQ pain   MS: Moving all 4  NEURO: Alert and oriented x 3.  PSYCH: Normal mood and affect, pleasant " "and agreeable during interview and exam.            RADIOLOGY: The following tests were reviewed:   XR KUB     HISTORY: Right ureteral calculus .     COMPARISON: 1/3/2024.     TECHNIQUE: 2 views of the abdomen.     FINDINGS:     A 3 mm pelvic inlet right ureteral calculus on the prior CT is not  seen. A calcification at the right ureterovesicular junction is  visible, grossly unchanged in position.     CLEM RODAS MD     LABS: The last test results for Mr. Alexandr Guzmán were reviewed:  Results for orders placed or performed in visit on 01/15/24 (from the past 24 hour(s))   Urinalysis Macroscopic   Result Value Ref Range    Color Urine Yellow Colorless, Straw, Light Yellow, Yellow    Appearance Urine Clear Clear    Glucose Urine Negative Negative mg/dL    Bilirubin Urine Negative Negative    Ketones Urine Negative Negative mg/dL    Specific Gravity Urine 1.011 1.000 - 1.030    Blood Urine Small (A) Negative    pH Urine 5.0 5.0 - 9.0    Protein Albumin Urine Negative Negative mg/dL    Urobilinogen Urine Normal Normal, 2.0 mg/dL    Nitrite Urine Negative Negative    Leukocyte Esterase Urine Small (A) Negative       PSA - No results found for: \"PSA\"  BMP -   Recent Labs   Lab Test 24      POTASSIUM 4.2   CHLORIDE 102   CO2 28   BUN 18.7   CR 1.02   GLC 92   LAILA 9.2       CBC -   Recent Labs   Lab Test 24   WBC 4.1   HGB 13.5          ASSESSMENT:   Right distal 1 cm ureteral stone  Right renal colic     PLAN:   Continued obstruction with both 8 mm and 3 mm right distal obstructing ureteral stones.       Imaging reviewed .  I give it a 10-20% chance of passing this large stone given its location and size.    I recommend BMP to assess kidney function.      Treatment options discussed includin. Ureteroscopy with laser lithotripsy, basketing of stones and stent placement.  Risks of bleeding, infection, ureteral and urethral injury/stricture, failure with the need for a " 2nd stage procedure and irritation with urgency/frequency/bleeding from the stent.       Risks and benefits of each discussed. Risks and symptoms from stent placement discussed.    25 minutes spent on the date of this encounter doing chart review, history and exam, documentation and further activities as noted above.      Ramón Alfaro MD  [facility]

## 2024-01-15 NOTE — NURSING NOTE
"Chief Complaint   Patient presents with    Follow Up     Kidney stone    post void residual  Review Of Systems  Skin: negative  Eyes: negative  Ears/Nose/Throat: negative  Respiratory: No shortness of breath, dyspnea on exertion, cough, or hemoptysis  Cardiovascular: negative  Gastrointestinal: negative  Genitourinary: kidney stone  Musculoskeletal: negative  Neurologic: negative  Psychiatric: negative  Hematologic/Lymphatic/Immunologic: negative  Endocrine: negative      Initial /76   Pulse 94   Temp 97.7  F (36.5  C) (Tympanic)   Resp 20   Ht 1.702 m (5' 7\")   Wt 59 kg (130 lb)   SpO2 97%   BMI 20.36 kg/m   Estimated body mass index is 20.36 kg/m  as calculated from the following:    Height as of this encounter: 1.702 m (5' 7\").    Weight as of this encounter: 59 kg (130 lb).  Medication Review: complete    The next two questions are to help us understand your food security.  If you are feeling you need any assistance in this area, we have resources available to support you today.           No data to display                  Health Care Directive:  Patient does not have a Health Care Directive or Living Will: Discussed advance care planning with patient; however, patient declined at this time.    Cheryl Pettit, ALLEN      "

## 2024-01-15 NOTE — H&P (VIEW-ONLY)
"Chief Complaint: Follow Up (Kidney stone )  .    HPI: Mr. Alexandr Guzmán is a 33 year old year old male presenting today January 15, 2024 in follow up for evaluation of a right distal 1cm obstructing stone.    Had blood with moderate pain over the weekend.  No fever/chills.    Continues to work.  Remains on tamsulosin.  Continues to drink 1 gallon water/day.     KUB with persistence of the stone today.     No past medical history on file.    Past Surgical History:   Procedure Laterality Date    OTHER SURGICAL HISTORY      69270.0,PAST SURGICAL HISTORY,None       Current Outpatient Medications   Medication Sig Dispense Refill    oxyCODONE (ROXICODONE) 5 MG tablet Take 1 tablet (5 mg) by mouth every 6 hours as needed for severe pain 20 tablet 0    tamsulosin (FLOMAX) 0.4 MG capsule Take 1 capsule (0.4 mg) by mouth daily 7 capsule 0    ondansetron (ZOFRAN ODT) 4 MG ODT tab Take 1 tablet (4 mg) by mouth every 8 hours as needed for nausea (Patient not taking: Reported on 1/8/2024) 5 tablet 0       ALLERGIES: Sulfa antibiotics      REVIEW OF SYSTEMS:  Skin: Skin: negative  Eyes: negative  Ears/Nose/Throat: negative  Respiratory: No shortness of breath, dyspnea on exertion, cough, or hemoptysis  Cardiovascular: negative  Gastrointestinal: negative  Genitourinary: kidney stone  Musculoskeletal: negative  Neurologic: negative  Psychiatric: negative  Hematologic/Lymphatic/Immunologic: negative  Endocrine: negative      GENERAL PHYSICAL EXAM:   Vitals: /76   Pulse 94   Temp 97.7  F (36.5  C) (Tympanic)   Resp 20   Ht 1.702 m (5' 7\")   Wt 59 kg (130 lb)   SpO2 97%   BMI 20.36 kg/m    Body mass index is 20.36 kg/m .    GENERAL: Well groomed, well developed, well nourished male in NAD.  ENT:  ENT exam normal  CV:  Warm extremities   RESPIRATORY: Normal respiratory effort.   GI:  Soft, NT, ND, mild flank pain , moderate LQ pain   MS: Moving all 4  NEURO: Alert and oriented x 3.  PSYCH: Normal mood and affect, pleasant " "and agreeable during interview and exam.            RADIOLOGY: The following tests were reviewed:   XR KUB     HISTORY: Right ureteral calculus .     COMPARISON: 1/3/2024.     TECHNIQUE: 2 views of the abdomen.     FINDINGS:     A 3 mm pelvic inlet right ureteral calculus on the prior CT is not  seen. A calcification at the right ureterovesicular junction is  visible, grossly unchanged in position.     CLEM RODAS MD     LABS: The last test results for Mr. Alexandr Guzmán were reviewed:  Results for orders placed or performed in visit on 01/15/24 (from the past 24 hour(s))   Urinalysis Macroscopic   Result Value Ref Range    Color Urine Yellow Colorless, Straw, Light Yellow, Yellow    Appearance Urine Clear Clear    Glucose Urine Negative Negative mg/dL    Bilirubin Urine Negative Negative    Ketones Urine Negative Negative mg/dL    Specific Gravity Urine 1.011 1.000 - 1.030    Blood Urine Small (A) Negative    pH Urine 5.0 5.0 - 9.0    Protein Albumin Urine Negative Negative mg/dL    Urobilinogen Urine Normal Normal, 2.0 mg/dL    Nitrite Urine Negative Negative    Leukocyte Esterase Urine Small (A) Negative       PSA - No results found for: \"PSA\"  BMP -   Recent Labs   Lab Test 24      POTASSIUM 4.2   CHLORIDE 102   CO2 28   BUN 18.7   CR 1.02   GLC 92   LAILA 9.2       CBC -   Recent Labs   Lab Test 24   WBC 4.1   HGB 13.5          ASSESSMENT:   Right distal 1 cm ureteral stone  Right renal colic     PLAN:   Continued obstruction with both 8 mm and 3 mm right distal obstructing ureteral stones.       Imaging reviewed .  I give it a 10-20% chance of passing this large stone given its location and size.    I recommend BMP to assess kidney function.      Treatment options discussed includin. Ureteroscopy with laser lithotripsy, basketing of stones and stent placement.  Risks of bleeding, infection, ureteral and urethral injury/stricture, failure with the need for a " 2nd stage procedure and irritation with urgency/frequency/bleeding from the stent.       Risks and benefits of each discussed. Risks and symptoms from stent placement discussed.    25 minutes spent on the date of this encounter doing chart review, history and exam, documentation and further activities as noted above.      Ramón Alfaro MD  [facility]

## 2024-01-19 ENCOUNTER — ANESTHESIA EVENT (OUTPATIENT)
Dept: SURGERY | Facility: OTHER | Age: 34
End: 2024-01-19
Payer: COMMERCIAL

## 2024-01-22 ENCOUNTER — HOSPITAL ENCOUNTER (OUTPATIENT)
Dept: GENERAL RADIOLOGY | Facility: OTHER | Age: 34
Discharge: HOME | End: 2024-01-22
Attending: UROLOGY | Admitting: UROLOGY
Payer: COMMERCIAL

## 2024-01-22 ENCOUNTER — HOSPITAL ENCOUNTER (OUTPATIENT)
Facility: OTHER | Age: 34
Discharge: HOME | End: 2024-01-22
Attending: UROLOGY | Admitting: UROLOGY
Payer: COMMERCIAL

## 2024-01-22 ENCOUNTER — ANESTHESIA (OUTPATIENT)
Dept: SURGERY | Facility: OTHER | Age: 34
End: 2024-01-22
Payer: COMMERCIAL

## 2024-01-22 VITALS
OXYGEN SATURATION: 98 % | WEIGHT: 130 LBS | DIASTOLIC BLOOD PRESSURE: 81 MMHG | TEMPERATURE: 97.4 F | BODY MASS INDEX: 20.4 KG/M2 | SYSTOLIC BLOOD PRESSURE: 132 MMHG | HEART RATE: 94 BPM | HEIGHT: 67 IN | RESPIRATION RATE: 16 BRPM

## 2024-01-22 DIAGNOSIS — N20.1 RIGHT URETERAL CALCULUS: Primary | ICD-10-CM

## 2024-01-22 DIAGNOSIS — Z96.0 S/P URETERAL STENT PLACEMENT: ICD-10-CM

## 2024-01-22 DIAGNOSIS — N13.30 HYDRONEPHROSIS OF RIGHT KIDNEY: ICD-10-CM

## 2024-01-22 DIAGNOSIS — N20.1 RIGHT DISTAL URETERAL CALCULUS: Primary | ICD-10-CM

## 2024-01-22 PROCEDURE — 272N000002 HC OR SUPPLY OTHER OPNP: Performed by: UROLOGY

## 2024-01-22 PROCEDURE — 360N000084 HC SURGERY LEVEL 4 W/ FLUORO, PER MIN: Performed by: UROLOGY

## 2024-01-22 PROCEDURE — 258N000003 HC RX IP 258 OP 636: Performed by: NURSE ANESTHETIST, CERTIFIED REGISTERED

## 2024-01-22 PROCEDURE — 272N000001 HC OR GENERAL SUPPLY STERILE: Performed by: UROLOGY

## 2024-01-22 PROCEDURE — 250N000009 HC RX 250

## 2024-01-22 PROCEDURE — 258N000003 HC RX IP 258 OP 636

## 2024-01-22 PROCEDURE — C1769 GUIDE WIRE: HCPCS | Performed by: UROLOGY

## 2024-01-22 PROCEDURE — 999N000141 HC STATISTIC PRE-PROCEDURE NURSING ASSESSMENT: Performed by: UROLOGY

## 2024-01-22 PROCEDURE — 258N000003 HC RX IP 258 OP 636: Performed by: UROLOGY

## 2024-01-22 PROCEDURE — 999N000179 XR SURGERY CARM FLUORO LESS THAN 5 MIN W STILLS

## 2024-01-22 PROCEDURE — 258N000001 HC RX 258: Performed by: UROLOGY

## 2024-01-22 PROCEDURE — 52332 CYSTOSCOPY AND TREATMENT: CPT | Mod: RT | Performed by: UROLOGY

## 2024-01-22 PROCEDURE — 370N000017 HC ANESTHESIA TECHNICAL FEE, PER MIN: Performed by: UROLOGY

## 2024-01-22 PROCEDURE — 52352 CYSTOURETERO W/STONE REMOVE: CPT

## 2024-01-22 PROCEDURE — 250N000011 HC RX IP 250 OP 636: Performed by: UROLOGY

## 2024-01-22 PROCEDURE — 710N000012 HC RECOVERY PHASE 2, PER MINUTE: Performed by: UROLOGY

## 2024-01-22 PROCEDURE — 250N000025 HC SEVOFLURANE, PER MIN: Performed by: UROLOGY

## 2024-01-22 PROCEDURE — 710N000010 HC RECOVERY PHASE 1, LEVEL 2, PER MIN: Performed by: UROLOGY

## 2024-01-22 PROCEDURE — 250N000011 HC RX IP 250 OP 636

## 2024-01-22 PROCEDURE — 52352 CYSTOURETERO W/STONE REMOVE: CPT | Mod: RT | Performed by: UROLOGY

## 2024-01-22 PROCEDURE — 250N000013 HC RX MED GY IP 250 OP 250 PS 637: Performed by: UROLOGY

## 2024-01-22 PROCEDURE — 82365 CALCULUS SPECTROSCOPY: CPT | Performed by: UROLOGY

## 2024-01-22 PROCEDURE — C2617 STENT, NON-COR, TEM W/O DEL: HCPCS | Performed by: UROLOGY

## 2024-01-22 DEVICE — URETERAL STENT
Type: IMPLANTABLE DEVICE | Site: URETER | Status: NON-FUNCTIONAL
Brand: CONTOUR™
Removed: 2024-02-20

## 2024-01-22 RX ORDER — OXYCODONE HYDROCHLORIDE 5 MG/1
5 TABLET ORAL EVERY 4 HOURS PRN
Status: DISCONTINUED | OUTPATIENT
Start: 2024-01-22 | End: 2024-01-22 | Stop reason: HOSPADM

## 2024-01-22 RX ORDER — PROPOFOL 10 MG/ML
INJECTION, EMULSION INTRAVENOUS PRN
Status: DISCONTINUED | OUTPATIENT
Start: 2024-01-22 | End: 2024-01-22

## 2024-01-22 RX ORDER — AMOXICILLIN 250 MG
1-2 CAPSULE ORAL 2 TIMES DAILY
Qty: 30 TABLET | Refills: 0 | Status: ON HOLD | OUTPATIENT
Start: 2024-01-22 | End: 2024-02-20

## 2024-01-22 RX ORDER — OXYCODONE HYDROCHLORIDE 5 MG/1
10 TABLET ORAL
Status: DISCONTINUED | OUTPATIENT
Start: 2024-01-22 | End: 2024-01-22 | Stop reason: HOSPADM

## 2024-01-22 RX ORDER — LIDOCAINE 40 MG/G
CREAM TOPICAL
Status: DISCONTINUED | OUTPATIENT
Start: 2024-01-22 | End: 2024-01-22 | Stop reason: HOSPADM

## 2024-01-22 RX ORDER — NALOXONE HYDROCHLORIDE 0.4 MG/ML
0.4 INJECTION, SOLUTION INTRAMUSCULAR; INTRAVENOUS; SUBCUTANEOUS
Status: DISCONTINUED | OUTPATIENT
Start: 2024-01-22 | End: 2024-01-22 | Stop reason: HOSPADM

## 2024-01-22 RX ORDER — HYDROMORPHONE HCL IN WATER/PF 6 MG/30 ML
0.4 PATIENT CONTROLLED ANALGESIA SYRINGE INTRAVENOUS EVERY 5 MIN PRN
Status: DISCONTINUED | OUTPATIENT
Start: 2024-01-22 | End: 2024-01-22 | Stop reason: HOSPADM

## 2024-01-22 RX ORDER — GLYCOPYRROLATE 0.2 MG/ML
INJECTION, SOLUTION INTRAMUSCULAR; INTRAVENOUS PRN
Status: DISCONTINUED | OUTPATIENT
Start: 2024-01-22 | End: 2024-01-22

## 2024-01-22 RX ORDER — NALOXONE HYDROCHLORIDE 0.4 MG/ML
0.2 INJECTION, SOLUTION INTRAMUSCULAR; INTRAVENOUS; SUBCUTANEOUS
Status: DISCONTINUED | OUTPATIENT
Start: 2024-01-22 | End: 2024-01-22 | Stop reason: HOSPADM

## 2024-01-22 RX ORDER — ONDANSETRON 2 MG/ML
INJECTION INTRAMUSCULAR; INTRAVENOUS PRN
Status: DISCONTINUED | OUTPATIENT
Start: 2024-01-22 | End: 2024-01-22

## 2024-01-22 RX ORDER — KETAMINE HYDROCHLORIDE 10 MG/ML
INJECTION INTRAMUSCULAR; INTRAVENOUS PRN
Status: DISCONTINUED | OUTPATIENT
Start: 2024-01-22 | End: 2024-01-22

## 2024-01-22 RX ORDER — ONDANSETRON 2 MG/ML
4 INJECTION INTRAMUSCULAR; INTRAVENOUS EVERY 30 MIN PRN
Status: DISCONTINUED | OUTPATIENT
Start: 2024-01-22 | End: 2024-01-22 | Stop reason: HOSPADM

## 2024-01-22 RX ORDER — ACETAMINOPHEN 325 MG/1
975 TABLET ORAL ONCE
Status: COMPLETED | OUTPATIENT
Start: 2024-01-22 | End: 2024-01-22

## 2024-01-22 RX ORDER — FENTANYL CITRATE 50 UG/ML
25 INJECTION, SOLUTION INTRAMUSCULAR; INTRAVENOUS EVERY 5 MIN PRN
Status: DISCONTINUED | OUTPATIENT
Start: 2024-01-22 | End: 2024-01-22 | Stop reason: HOSPADM

## 2024-01-22 RX ORDER — FENTANYL CITRATE 50 UG/ML
INJECTION, SOLUTION INTRAMUSCULAR; INTRAVENOUS PRN
Status: DISCONTINUED | OUTPATIENT
Start: 2024-01-22 | End: 2024-01-22

## 2024-01-22 RX ORDER — ACETAMINOPHEN 650 MG/1
650 SUPPOSITORY RECTAL ONCE
Status: DISCONTINUED | OUTPATIENT
Start: 2024-01-22 | End: 2024-01-22

## 2024-01-22 RX ORDER — HYDROMORPHONE HCL IN WATER/PF 6 MG/30 ML
0.2 PATIENT CONTROLLED ANALGESIA SYRINGE INTRAVENOUS EVERY 5 MIN PRN
Status: DISCONTINUED | OUTPATIENT
Start: 2024-01-22 | End: 2024-01-22 | Stop reason: HOSPADM

## 2024-01-22 RX ORDER — TOLTERODINE TARTRATE 2 MG/1
2 TABLET, EXTENDED RELEASE ORAL EVERY 12 HOURS PRN
Qty: 30 TABLET | Refills: 0 | Status: ON HOLD | OUTPATIENT
Start: 2024-01-22 | End: 2024-02-20

## 2024-01-22 RX ORDER — CEFAZOLIN SODIUM/WATER 2 G/20 ML
2 SYRINGE (ML) INTRAVENOUS
Status: DISCONTINUED | OUTPATIENT
Start: 2024-01-22 | End: 2024-01-22 | Stop reason: HOSPADM

## 2024-01-22 RX ORDER — DEXAMETHASONE SODIUM PHOSPHATE 4 MG/ML
INJECTION, SOLUTION INTRA-ARTICULAR; INTRALESIONAL; INTRAMUSCULAR; INTRAVENOUS; SOFT TISSUE PRN
Status: DISCONTINUED | OUTPATIENT
Start: 2024-01-22 | End: 2024-01-22

## 2024-01-22 RX ORDER — OXYCODONE HYDROCHLORIDE 5 MG/1
5 TABLET ORAL EVERY 4 HOURS PRN
Qty: 8 TABLET | Refills: 0 | Status: SHIPPED | OUTPATIENT
Start: 2024-01-22 | End: 2024-02-15

## 2024-01-22 RX ORDER — CEFAZOLIN SODIUM/WATER 2 G/20 ML
2 SYRINGE (ML) INTRAVENOUS SEE ADMIN INSTRUCTIONS
Status: DISCONTINUED | OUTPATIENT
Start: 2024-01-22 | End: 2024-01-22 | Stop reason: HOSPADM

## 2024-01-22 RX ORDER — TOLTERODINE TARTRATE 2 MG/1
2 TABLET, EXTENDED RELEASE ORAL ONCE
Status: DISCONTINUED | OUTPATIENT
Start: 2024-01-22 | End: 2024-01-22 | Stop reason: HOSPADM

## 2024-01-22 RX ORDER — SODIUM CHLORIDE, SODIUM LACTATE, POTASSIUM CHLORIDE, CALCIUM CHLORIDE 600; 310; 30; 20 MG/100ML; MG/100ML; MG/100ML; MG/100ML
INJECTION, SOLUTION INTRAVENOUS CONTINUOUS
Status: DISCONTINUED | OUTPATIENT
Start: 2024-01-22 | End: 2024-01-22 | Stop reason: HOSPADM

## 2024-01-22 RX ORDER — OXYCODONE HYDROCHLORIDE 5 MG/1
5 TABLET ORAL
Status: DISCONTINUED | OUTPATIENT
Start: 2024-01-22 | End: 2024-01-22 | Stop reason: HOSPADM

## 2024-01-22 RX ORDER — LIDOCAINE HYDROCHLORIDE 20 MG/ML
INJECTION, SOLUTION INFILTRATION; PERINEURAL PRN
Status: DISCONTINUED | OUTPATIENT
Start: 2024-01-22 | End: 2024-01-22

## 2024-01-22 RX ORDER — ONDANSETRON 4 MG/1
4 TABLET, ORALLY DISINTEGRATING ORAL EVERY 30 MIN PRN
Status: DISCONTINUED | OUTPATIENT
Start: 2024-01-22 | End: 2024-01-22 | Stop reason: HOSPADM

## 2024-01-22 RX ORDER — FENTANYL CITRATE 50 UG/ML
50 INJECTION, SOLUTION INTRAMUSCULAR; INTRAVENOUS EVERY 5 MIN PRN
Status: DISCONTINUED | OUTPATIENT
Start: 2024-01-22 | End: 2024-01-22 | Stop reason: HOSPADM

## 2024-01-22 RX ADMIN — Medication 30 MG: at 12:30

## 2024-01-22 RX ADMIN — Medication 10 MG: at 12:42

## 2024-01-22 RX ADMIN — Medication 10 MG: at 12:52

## 2024-01-22 RX ADMIN — MIDAZOLAM HYDROCHLORIDE 2 MG: 1 INJECTION, SOLUTION INTRAMUSCULAR; INTRAVENOUS at 12:15

## 2024-01-22 RX ADMIN — DEXMEDETOMIDINE HYDROCHLORIDE 4 MCG: 100 INJECTION, SOLUTION INTRAVENOUS at 12:52

## 2024-01-22 RX ADMIN — FENTANYL CITRATE 50 MCG: 50 INJECTION INTRAMUSCULAR; INTRAVENOUS at 12:30

## 2024-01-22 RX ADMIN — DEXAMETHASONE SODIUM PHOSPHATE 4 MG: 4 INJECTION, SOLUTION INTRA-ARTICULAR; INTRALESIONAL; INTRAMUSCULAR; INTRAVENOUS; SOFT TISSUE at 12:30

## 2024-01-22 RX ADMIN — ONDANSETRON HYDROCHLORIDE 4 MG: 2 SOLUTION INTRAMUSCULAR; INTRAVENOUS at 12:30

## 2024-01-22 RX ADMIN — FENTANYL CITRATE 50 MCG: 50 INJECTION INTRAMUSCULAR; INTRAVENOUS at 12:40

## 2024-01-22 RX ADMIN — PROPOFOL 200 MG: 10 INJECTION, EMULSION INTRAVENOUS at 12:22

## 2024-01-22 RX ADMIN — ACETAMINOPHEN 975 MG: 325 TABLET, FILM COATED ORAL at 12:00

## 2024-01-22 RX ADMIN — SODIUM CHLORIDE, SODIUM LACTATE, POTASSIUM CHLORIDE, AND CALCIUM CHLORIDE: 600; 310; 30; 20 INJECTION, SOLUTION INTRAVENOUS at 12:05

## 2024-01-22 RX ADMIN — GLYCOPYRROLATE 0.2 MG: 0.2 INJECTION, SOLUTION INTRAMUSCULAR; INTRAVENOUS at 12:30

## 2024-01-22 RX ADMIN — Medication 2 G: at 12:07

## 2024-01-22 RX ADMIN — LIDOCAINE HYDROCHLORIDE 60 MG: 20 INJECTION, SOLUTION INFILTRATION; PERINEURAL at 12:22

## 2024-01-22 RX ADMIN — SODIUM CHLORIDE, SODIUM LACTATE, POTASSIUM CHLORIDE, AND CALCIUM CHLORIDE: 600; 310; 30; 20 INJECTION, SOLUTION INTRAVENOUS at 13:06

## 2024-01-22 RX ADMIN — DEXMEDETOMIDINE HYDROCHLORIDE 4 MCG: 100 INJECTION, SOLUTION INTRAVENOUS at 12:42

## 2024-01-22 ASSESSMENT — ACTIVITIES OF DAILY LIVING (ADL)
ADLS_ACUITY_SCORE: 35
ADLS_ACUITY_SCORE: 35

## 2024-01-22 ASSESSMENT — LIFESTYLE VARIABLES: TOBACCO_USE: 1

## 2024-01-22 NOTE — ANESTHESIA PROCEDURE NOTES
Airway         Procedure Start/Stop Times: 1/22/2024 12:25 PM  Staff -        CRNA: Arnold Casanova APRN CRNA       Other Anesthesia Staff: Sraah Westfall       Performed By: CRNA and other anesthesia staff  Consent for Airway        Urgency: elective  Indications and Patient Condition       Indications for airway management: ileana-procedural       Induction type:intravenous       Mask difficulty assessment: 1 - vent by mask    Final Airway Details       Final airway type: supraglottic airway    Supraglottic Airway Details        Type: LMA       Brand: I-Gel       LMA size: 4    Post intubation assessment        Placement verified by: capnometry, equal breath sounds and chest rise        Number of attempts at approach: 1       Number of other approaches attempted: 0       Secured with: tape       Ease of procedure: easy       Dentition: Intact and Unchanged    Medication(s) Administered   Medication Administration Time: 1/22/2024 12:25 PM

## 2024-01-22 NOTE — BRIEF OP NOTE
[facility]    Brief Operative Note    Pre-operative diagnosis: Right ureteral calculus [N20.1]  Post-operative diagnosis Distal impacted 1 cm ureteral stone    Procedure: CYSTOURETEROSCOPY, RIGHT RETROGRADE PYELOGRAM WITH BASKETING AND RIGHT URETERAL STENT INSERTION, Right - Ureter    Surgeon: Surgeon(s) and Role:     * Ramón Alfaro MD - Primary  Anesthesia: General   Estimated Blood Loss: None    Drains: 6 x 26 JJ stent right, no tether  Specimens:   ID Type Source Tests Collected by Time Destination   A : RIGHT DISTAL URETERAL CALCULUS Calculus/Stone Ureter, Right STONE ANALYSIS Ramón Alfaro MD 1/22/2024 12:58 PM      Findings:   Impacted stone with significant ureteral edema.  Not completely sure all fragments were removed.  Several fragments were removed and stent was placed without tether.  Anticipate stent placement for 2 to 3 weeks given the amount of edema from the stone. .  Complications: None.  Implants:   Implant Name Type Inv. Item Serial No.  Lot No. LRB No. Used Action   STENT URETERAL CONTOUR SOFT PERCUFLEX [id number] - [id number] Stent STENT URETERAL CONTOUR SOFT PERCUFLEX [id number]  [employer] [id number] Right 1 Implanted

## 2024-01-22 NOTE — ANESTHESIA PREPROCEDURE EVALUATION
Anesthesia Pre-Procedure Evaluation    Patient: Alexandr Guzmán   MRN: [id number] : 1990        Procedure : Procedure(s):  CYSTOURETEROSCOPY, RIGHT RETROGRADE PYELOGRAM WITH LITHOTRIPSY USING LASER AND RIGHT URETERAL STENT INSERTION          History reviewed. No pertinent past medical history.   Past Surgical History:   Procedure Laterality Date    OTHER SURGICAL HISTORY      99772.0,PAST SURGICAL HISTORY,None      Allergies   Allergen Reactions    Sulfa Antibiotics Unknown      Social History     Tobacco Use    Smoking status: Some Days     Types: Cigarettes    Smokeless tobacco: Never   Substance Use Topics    Alcohol use: Yes     Comment: occasional      Wt Readings from Last 1 Encounters:   24 59 kg (130 lb)        Anesthesia Evaluation   Pt has had prior anesthetic.     No history of anesthetic complications       ROS/MED HX  ENT/Pulmonary:     (+)                tobacco use, Current use,                       Neurologic:       Cardiovascular:       METS/Exercise Tolerance: >4 METS    Hematologic:       Musculoskeletal:       GI/Hepatic:       Renal/Genitourinary:       Endo:       Psychiatric/Substance Use:     (+) psychiatric history anxiety       Infectious Disease:       Malignancy:       Other:          Physical Exam    Airway  airway exam normal      Mallampati: II   TM distance: > 3 FB   Neck ROM: full   Mouth opening: > 3 cm    Respiratory Devices and Support         Dental       (+) Multiple visibly decayed, broken teeth    B=Bridge, C=Chipped, L=Loose, M=Missing    Cardiovascular   cardiovascular exam normal       Rhythm and rate: regular and normal     Pulmonary   pulmonary exam normal        breath sounds clear to auscultation       OUTSIDE LABS:  CBC:   Lab Results   Component Value Date    WBC 4.1 2024    HGB 13.5 2024    HCT 38.5 (L) 2024     2024     BMP:   Lab Results   Component Value Date     01/15/2024     2024    POTASSIUM 4.3  "01/15/2024    POTASSIUM 4.2 01/03/2024    CHLORIDE 99 01/15/2024    CHLORIDE 102 01/03/2024    CO2 30 (H) 01/15/2024    CO2 28 01/03/2024    BUN 18.5 01/15/2024    BUN 18.7 01/03/2024    CR 1.09 01/15/2024    CR 1.02 01/03/2024    GLC 86 01/15/2024    GLC 92 01/03/2024     COAGS: No results found for: \"PTT\", \"INR\", \"FIBR\"  POC: No results found for: \"BGM\", \"HCG\", \"HCGS\"  HEPATIC: No results found for: \"ALBUMIN\", \"PROTTOTAL\", \"ALT\", \"AST\", \"GGT\", \"ALKPHOS\", \"BILITOTAL\", \"BILIDIRECT\", \"CYNTHIA\"  OTHER:   Lab Results   Component Value Date    LAILA 9.3 01/15/2024       Anesthesia Plan    ASA Status:  2    NPO Status:  NPO Appropriate    Anesthesia Type: General.     - Airway: LMA   Induction: Intravenous.   Maintenance: Balanced.        Consents    Anesthesia Plan(s) and associated risks, benefits, and realistic alternatives discussed. Questions answered and patient/representative(s) expressed understanding.     - Discussed: Risks, Benefits and Alternatives for BOTH SEDATION and the PROCEDURE were discussed     - Discussed with:  Patient, Parent (Mother and/or Father)      - Extended Intubation/Ventilatory Support Discussed: No.      - Patient is DNR/DNI Status: No     Use of blood products discussed: No .     Postoperative Care    Pain management: IV analgesics, Multi-modal analgesia.   PONV prophylaxis: Ondansetron (or other 5HT-3), Dexamethasone or Solumedrol     Comments:    Other Comments: Risks, benefits and alternatives discussed and would like to proceed.                DEYANIRA Lopez CRNA    I have reviewed the pertinent notes and labs in the chart from the past 30 days and (re)examined the patient.  Any updates or changes from those notes are reflected in this note.                  "

## 2024-01-22 NOTE — OP NOTE
Operative report    Preoperative diagnosis: Distal right 1 cm impacted ureteral stone, moderate right hydronephrosis    Postoperative diagnosis: Same    Procedure: Cystoscopy, right retrograde pyelogram, right ureteroscopy, basketing of stone fragments, right 6 x 26 stent placement without tether    Anesthesia type: General    Anesthesiologist: Cedric Alvarez CRNA and Arnold Casanova CRNA    Surgeon:  Ramón Alfaro MD    Procedure description: Consent was obtained.  The risks were explained to this patient and his father including bleeding, infection, ureteral injury with stricture, failure to retrieve the stone forcing stent placement with a return to the OR, urgency frequency from the stent, bleeding from the stent, lower extremity DVT, pneumonia and anesthetic complication.    He was taken to the procedure room.  General anesthesia was given.  Antibiotics were given.  Timeout was done and all were in agreement.  Films were on the board demonstrating the right distal stone.    Cystoscopy was done with a 22 Anguillan cystoscope and a 30 degree lens.  The right ureter was inflamed consistent with longstanding obstruction.  An open-ended catheter was used to do a retrograde demonstrating an impacted distal stone with significant edema.    Through the open-ended catheter a 0.38 Glidewire was placed all the way up into the kidney with some mild difficulty.    We then placed the semirigid ureteroscope into the bladder up to the right distal ureter.  We attempted to place a second wire and to follow that up the ureter but there was too much edema.    We remove the semirigid and used a 6-10 dual-lumen catheter over the first wire to gently dilate the distal ureter.  We then replaced the semirigid and followed up a second 0.35 sensor wire to the stone.  Significant edema from the stone.  The stone appeared to be broken into several fragments.    A tipless basket was used for remove these fragments.  We then went back into  the ureter and looking for any adjacent or additional stones.  We went proximal in the ureter as far as we could before the scope was limited because of the distal ureteral edema.    No other stones were seen however we did do a retrograde demonstrating moderate to severe hydronephrosis.  I was not convinced that all stones were removed.  There was no injury to the ureter or extravasation.    We exited the ureter still unable to see any additional stones.  Fluoroscopy was done and no stones were seen.  The safety wire was then placed.    Over the wire through the cystoscope a 6 x 26 stent was placed with a curl noted in the kidney and the bladder.  The bladder was drained and the patient was awakened.    We are planning to repeat a CT noncontrast this next week but we will leave the stent in until we are convinced he does not need a staged procedure.    EBL: Minimal    Drains: 6 x 26 JJ right stent without tether    Complications: None    Specimens: Stones    Disposition: To recovery in good condition.

## 2024-01-22 NOTE — OR NURSING
PACU Transfer Note    Alexandr Guzmán was transferred to [facility] via cart.  Equipment used for transport:  none.  Accompanied by:  RN  Prescriptions were: Erx       PACU Respiratory Event Documentation     1) Episodes of Apnea greater than or equal to 10 seconds: 0    2) Bradypnea - less than 8 breaths per minute: no         3) Pain score on 0 to 10 scale: 0    4) Pain-sedation mismatch (yes or no): no    5) Repeated 02 desaturation less than 90% (yes or no): no    Anesthesia notified? (yes or no): no    Report to Yvonne    Any of the above events occuring repeatedly in separate 30 minute intervals may be considered recurrent PACU respiratory events.    Patient stable and meets phase 1 discharge criteria for transport from PACU.

## 2024-01-22 NOTE — ANESTHESIA POSTPROCEDURE EVALUATION
Patient: Alexandr Guzmán    Procedure: Procedure(s):  CYSTOURETEROSCOPY, RIGHT RETROGRADE PYELOGRAM WITH BASKETING AND RIGHT URETERAL STENT INSERTION       Anesthesia Type:  General    Note:  Disposition: Outpatient   Postop Pain Control: Uneventful            Sign Out: Well controlled pain   PONV: No   Neuro/Psych: Uneventful            Sign Out: Acceptable/Baseline neuro status   Airway/Respiratory: Uneventful            Sign Out: Acceptable/Baseline resp. status   CV/Hemodynamics: Uneventful            Sign Out: Acceptable CV status; No obvious hypovolemia; No obvious fluid overload   Other NRE: NONE   DID A NON-ROUTINE EVENT OCCUR? No       Last vitals:  Vitals Value Taken Time   /77 01/22/24 1345   Temp 97.4  F (36.3  C) 01/22/24 1345   Pulse 92 01/22/24 1346   Resp 17 01/22/24 1346   SpO2 100 % 01/22/24 1346   Vitals shown include unfiled device data.    Electronically Signed By: DEYANIRA Lopez CRNA  January 22, 2024  2:58 PM

## 2024-01-22 NOTE — OR NURSING
Patient was discharged home with father via w/c.   Discharge instructions were reviewed with patient . And he verbalized understanding.   Prescriptions: eRX to susanne

## 2024-01-22 NOTE — DISCHARGE INSTRUCTIONS
1.  Warm baths as needed for any stent or right-sided pain.  2.  I renewed your oxycodone for pain control.  This will cause constipation so use MiraLAX or senna as prescribed.  3.  Tolterodine 2 mg twice daily for any urgency or frequency from the stent.  4.  Expect blood in your urine with urinary urgency and frequency from the stent.  5.  Avoid constipation.  Avoid caffeine.  6.  No driving on pain medicine or heavy activity until the stent is removed.  7.  The ureter was fairly edematous and I was not convinced all stones were removed therefore anticipate a CT scan this week before stent is removed.  We will need to keep the stent in for 3 weeks given the amount of edema and trauma from the stone.  8.  Call with any fevers, chills or concerns.    [facility]  Adult Discharge Orders & Instructions      For 24 hours after surgery:  Get plenty of rest.  A responsible adult must stay with you for at least 24 hours after you leave the hospital.   You may feel lightheaded.  IF so, sit for a few minutes before standing.  Have someone help you get up.   You may have a slight fever. Call the doctor if your fever is over 101 F (38.3 C) (taken under the tongue) or lasts longer than 24 hours.  You may have a dry mouth, a sore throat, muscle aches or trouble sleeping.  These should go away after 24 hours.  Do not make important or legal decisions.  6.   Do not drive or use heavy equipment.  If you have weakness or tingling, don't drive or use heavy equipment until this feeling goes away.                                                                                                                                                                         To contact a doctor, call    [phone]______________

## 2024-01-22 NOTE — ANESTHESIA CARE TRANSFER NOTE
Patient: Alexandr Guzmán    Procedure: Procedure(s):  CYSTOURETEROSCOPY, RIGHT RETROGRADE PYELOGRAM WITH BASKETING AND RIGHT URETERAL STENT INSERTION       Diagnosis: Right ureteral calculus [N20.1]  Diagnosis Additional Information: No value filed.    Anesthesia Type:   General     Note:    Oropharynx: oral airway in place (LMA insitu)    Oxygen Supplementation: blow-by O2  Level of Supplemental Oxygen (L/min / FiO2): 8    Dentition: dentition unchanged  Vital Signs Stable: post-procedure vital signs reviewed and stable  Report to RN Given: handoff report given  Patient transferred to: PACU    Handoff Report: Identifed the Patient, Identified the Reponsible Provider, Reviewed the pertinent medical history, Discussed the surgical course, Reviewed Intra-OP anesthesia mangement and issues during anesthesia, Set expectations for post-procedure period and Allowed opportunity for questions and acknowledgement of understanding  Vitals:  Vitals Value Taken Time   BP     Temp     Pulse     Resp     SpO2         Electronically Signed By: DEYANIRA Lopez CRNA  January 22, 2024  1:15 PM

## 2024-01-22 NOTE — INTERVAL H&P NOTE
I have reviewed the surgical (or preoperative) H&P that is linked to this encounter, and examined the patient. There are no significant changes    GENERAL: Well groomed, well developed, well nourished male in NAD.  ENT:  ENT exam normal  CV:  Warm extremities , RRR  RESPIRATORY: Normal respiratory effort. CTA bilaterally   GI:  Soft, NT, ND, mild flank pain , moderate LQ pain   MS: Moving all 4  NEURO: Alert and oriented x 3.  PSYCH: Normal mood and affect, pleasant and agreeable during interview and exam.    Clinical Conditions Present on Arrival:  Clinically Significant Risk Factors Present on Admission

## 2024-01-27 LAB
APPEARANCE STONE: NORMAL
COMPN STONE: NORMAL
SPECIMEN WT: 22 MG

## 2024-01-30 ENCOUNTER — HOSPITAL ENCOUNTER (OUTPATIENT)
Dept: CT IMAGING | Facility: OTHER | Age: 34
Discharge: HOME | End: 2024-01-30
Attending: UROLOGY | Admitting: UROLOGY
Payer: COMMERCIAL

## 2024-01-30 DIAGNOSIS — N13.30 HYDRONEPHROSIS OF RIGHT KIDNEY: ICD-10-CM

## 2024-01-30 DIAGNOSIS — N20.1 RIGHT URETERAL CALCULUS: ICD-10-CM

## 2024-01-30 PROCEDURE — 74176 CT ABD & PELVIS W/O CONTRAST: CPT

## 2024-01-31 DIAGNOSIS — N20.1 RIGHT URETERAL CALCULUS: Primary | ICD-10-CM

## 2024-01-31 NOTE — RESULT ENCOUNTER NOTE
Ana Rosa Jesus, stone is still visible and he will need a second procedure to remove it.  He had a lot of edema the first time around and I was not convinced we got all the stone because of poor visualization.  As I suspect that the stone is still there.  The stent hopefully has reduce the edema.  We should get him scheduled for the 20th for a right ureteroscopy and stone extraction.  Barron

## 2024-02-19 ENCOUNTER — ANESTHESIA EVENT (OUTPATIENT)
Dept: SURGERY | Facility: OTHER | Age: 34
End: 2024-02-19
Payer: COMMERCIAL

## 2024-02-19 NOTE — H&P
" 33 year old year old male diagnosed with 2 right distal ureteral obstructing stones.  Status post attempted ureteroscopy with stent placement on 1/22/2024 where we encountered significant ureteral edema.  We suspected the stone was still in place.    Follow-up CT scan 1/30/2024 demonstrated persistence of an 8 and 3 mm distal right ureteral calculus with stent in proper placement.     Here today 2/20/2024 for definitive right ureteroscopy, laser lithotripsy, stent removal, retrograde, basketing and possible stent replacement.     Past Medical History   No past medical history on file.        Past Surgical History         Past Surgical History:   Procedure Laterality Date    OTHER SURGICAL HISTORY         08869.0,PAST SURGICAL HISTORY,None            Current Outpatient Prescriptions          Current Outpatient Medications   Medication Sig Dispense Refill    oxyCODONE (ROXICODONE) 5 MG tablet Take 1 tablet (5 mg) by mouth every 6 hours as needed for severe pain 20 tablet 0    tamsulosin (FLOMAX) 0.4 MG capsule Take 1 capsule (0.4 mg) by mouth daily 7 capsule 0    ondansetron (ZOFRAN ODT) 4 MG ODT tab Take 1 tablet (4 mg) by mouth every 8 hours as needed for nausea (Patient not taking: Reported on 1/8/2024) 5 tablet 0            ALLERGIES: Sulfa antibiotics      REVIEW OF SYSTEMS:  Skin: Skin: negative  Eyes: negative  Ears/Nose/Throat: negative  Respiratory: No shortness of breath, dyspnea on exertion, cough, or hemoptysis  Cardiovascular: negative  Gastrointestinal: negative  Genitourinary: kidney stone  Musculoskeletal: negative  Neurologic: negative  Psychiatric: negative  Hematologic/Lymphatic/Immunologic: negative  Endocrine: negative        GENERAL PHYSICAL EXAM:   Vitals: /76   Pulse 94   Temp 97.7  F (36.5  C) (Tympanic)   Resp 20   Ht 1.702 m (5' 7\")   Wt 59 kg (130 lb)   SpO2 97%   BMI 20.36 kg/m    Body mass index is 20.36 kg/m .     GENERAL: Well groomed, well developed, well nourished male " in NAD.  ENT:  ENT exam normal  CV:  Warm extremities , RRR  RESPIRATORY: Normal respiratory effort. CTA bilaterally   GI:  Soft, NT, ND, mild flank pain , moderate LQ pain   MS: Moving all 4  NEURO: Alert and oriented x 3.  PSYCH: Normal mood and affect, pleasant and agreeable during interview and exam.       RADIOLOGY:   CT ABDOMEN PELVIS W/O CONTRAST 1/30/2024     Exam reason: Distal right ureteral stone s/p stent; Right ureteral  calculus; Hydronephrosis of right kidney     Technique: Using helical CT technique, axial images of the abdomen and  pelvis  were acquired without administration of IV contrast. Coronal  and sagittal reformats were performed. This CT was performed using one  or more of the following dose reduction techniques: automated exposure  control, adjustment of the mA and/or kV according to patient size,  and/or use of iterative reconstruction technique.     Comparison: 1/3/2024.     FINDINGS:     NOTE: Noncontrast images are insensitive for detection of solid organ  abnormalities and some other types of pathology.     ABDOMEN:      Liver:  No focal abnormality.    Gallbladder:  No calcified gallstones.  Bile Ducts:  No significantly dilated ducts.  Spleen:  Unchanged mild splenomegaly.  Kidneys:  No hydronephrosis. A right ureteral stent is in the expected  position. No calculi within the kidneys. The 8 mm calculus abdomen the  right UVJ is again demonstrated. The smaller 3 mm calculus previously  in the distal right ureter is now adjacent to the 8 mm calculus at the  UVJ.  Adrenals:  No nodules.  Pancreas:  No mass or peripancreatic fat stranding.   Lymph Nodes:   No significant adenopathy.   Vascular:  No aortic aneurysm.   Abdominal wall:   No acute findings.     Pelvis: No mass or adenopathy.     Bowel/Mesentery/Peritoneum:   -No evidence of bowel obstruction.   -No acute inflammatory findings.   -No ascites.     Visualized portion of the Chest: No consolidation or pleural effusion.       "  Musculoskeletal: No acute osseous abnormality.                                                                       IMPRESSION:     There is an 8 mm and a 3 mm calculus in the right ureter at the right  UVJ. A right ureteral stent is in the expected position. No right  hydronephrosis.     EVELIO CHUA MD         LABS: The last test results for Mr. Alexandr Guzmán were reviewed:        Results for orders placed or performed in visit on 01/15/24 (from the past 24 hour(s))   Urinalysis Macroscopic   Result Value Ref Range     Color Urine Yellow Colorless, Straw, Light Yellow, Yellow     Appearance Urine Clear Clear     Glucose Urine Negative Negative mg/dL     Bilirubin Urine Negative Negative     Ketones Urine Negative Negative mg/dL     Specific Gravity Urine 1.011 1.000 - 1.030     Blood Urine Small (A) Negative     pH Urine 5.0 5.0 - 9.0     Protein Albumin Urine Negative Negative mg/dL     Urobilinogen Urine Normal Normal, 2.0 mg/dL     Nitrite Urine Negative Negative     Leukocyte Esterase Urine Small (A) Negative         PSA - No results found for: \"PSA\"  BMP -       Recent Labs   Lab Test 01/03/24 2005      POTASSIUM 4.2   CHLORIDE 102   CO2 28   BUN 18.7   CR 1.02   GLC 92   LAILA 9.2         CBC -       Recent Labs   Lab Test 01/03/24 2005   WBC 4.1   HGB 13.5            ASSESSMENT:   Right distal 1 cm ureteral stone status post ureteroscopy with stent placement  Right renal colic      PLAN:   Continued obstruction status post stent placement on the right side with both 8 mm and 3 mm right distal obstructing ureteral stones.       Plan for second stage ureteroscopy with laser lithotripsy, basketing of stones and stent placement.  Risks of bleeding, infection, ureteral and urethral injury/stricture, failure with the need for a 3rd stage procedure and irritation with urgency/frequency/bleeding from the stent.       Risks and benefits of each discussed. Risks and symptoms from stent placement " discussed.      Ramón Alfaro MD  [facility]

## 2024-02-20 ENCOUNTER — ANESTHESIA (OUTPATIENT)
Dept: SURGERY | Facility: OTHER | Age: 34
End: 2024-02-20
Payer: COMMERCIAL

## 2024-02-20 ENCOUNTER — HOSPITAL ENCOUNTER (OUTPATIENT)
Dept: GENERAL RADIOLOGY | Facility: OTHER | Age: 34
Discharge: HOME | End: 2024-02-20
Attending: UROLOGY | Admitting: UROLOGY
Payer: COMMERCIAL

## 2024-02-20 ENCOUNTER — HOSPITAL ENCOUNTER (OUTPATIENT)
Facility: OTHER | Age: 34
Discharge: HOME | End: 2024-02-20
Attending: UROLOGY | Admitting: UROLOGY
Payer: COMMERCIAL

## 2024-02-20 VITALS
SYSTOLIC BLOOD PRESSURE: 100 MMHG | TEMPERATURE: 97.7 F | WEIGHT: 130 LBS | HEIGHT: 67 IN | OXYGEN SATURATION: 97 % | HEART RATE: 94 BPM | RESPIRATION RATE: 14 BRPM | DIASTOLIC BLOOD PRESSURE: 68 MMHG | BODY MASS INDEX: 20.4 KG/M2

## 2024-02-20 DIAGNOSIS — N20.1 RIGHT DISTAL URETERAL CALCULUS: Primary | ICD-10-CM

## 2024-02-20 DIAGNOSIS — Z98.890 HISTORY OF REMOVAL OF URETERAL STENT: ICD-10-CM

## 2024-02-20 PROCEDURE — C2617 STENT, NON-COR, TEM W/O DEL: HCPCS | Performed by: UROLOGY

## 2024-02-20 PROCEDURE — 370N000017 HC ANESTHESIA TECHNICAL FEE, PER MIN: Performed by: UROLOGY

## 2024-02-20 PROCEDURE — C1769 GUIDE WIRE: HCPCS | Performed by: UROLOGY

## 2024-02-20 PROCEDURE — 52356 CYSTO/URETERO W/LITHOTRIPSY: CPT | Mod: RT | Performed by: UROLOGY

## 2024-02-20 PROCEDURE — 250N000013 HC RX MED GY IP 250 OP 250 PS 637: Performed by: UROLOGY

## 2024-02-20 PROCEDURE — 272N000002 HC OR SUPPLY OTHER OPNP: Performed by: UROLOGY

## 2024-02-20 PROCEDURE — 360N000084 HC SURGERY LEVEL 4 W/ FLUORO, PER MIN: Performed by: UROLOGY

## 2024-02-20 PROCEDURE — 999N000179 XR SURGERY CARM FLUORO LESS THAN 5 MIN W STILLS

## 2024-02-20 PROCEDURE — 250N000011 HC RX IP 250 OP 636: Performed by: UROLOGY

## 2024-02-20 PROCEDURE — 258N000001 HC RX 258: Performed by: UROLOGY

## 2024-02-20 PROCEDURE — 250N000026 HC DESFLURANE, PER MIN: Performed by: UROLOGY

## 2024-02-20 PROCEDURE — 250N000009 HC RX 250: Performed by: NURSE ANESTHETIST, CERTIFIED REGISTERED

## 2024-02-20 PROCEDURE — 999N000141 HC STATISTIC PRE-PROCEDURE NURSING ASSESSMENT: Performed by: UROLOGY

## 2024-02-20 PROCEDURE — 52356 CYSTO/URETERO W/LITHOTRIPSY: CPT | Performed by: NURSE ANESTHETIST, CERTIFIED REGISTERED

## 2024-02-20 PROCEDURE — 82365 CALCULUS SPECTROSCOPY: CPT | Performed by: UROLOGY

## 2024-02-20 PROCEDURE — 258N000003 HC RX IP 258 OP 636: Performed by: NURSE ANESTHETIST, CERTIFIED REGISTERED

## 2024-02-20 PROCEDURE — C1758 CATHETER, URETERAL: HCPCS | Performed by: UROLOGY

## 2024-02-20 PROCEDURE — 250N000011 HC RX IP 250 OP 636: Performed by: NURSE ANESTHETIST, CERTIFIED REGISTERED

## 2024-02-20 PROCEDURE — 710N000010 HC RECOVERY PHASE 1, LEVEL 2, PER MIN: Performed by: UROLOGY

## 2024-02-20 PROCEDURE — 272N000001 HC OR GENERAL SUPPLY STERILE: Performed by: UROLOGY

## 2024-02-20 PROCEDURE — 710N000012 HC RECOVERY PHASE 2, PER MINUTE: Performed by: UROLOGY

## 2024-02-20 PROCEDURE — 250N000025 HC SEVOFLURANE, PER MIN: Performed by: UROLOGY

## 2024-02-20 DEVICE — URETERAL STENT
Type: IMPLANTABLE DEVICE | Site: URETER | Status: FUNCTIONAL
Brand: CONTOUR™

## 2024-02-20 RX ORDER — TOLTERODINE TARTRATE 2 MG/1
2 TABLET, EXTENDED RELEASE ORAL ONCE
Status: COMPLETED | OUTPATIENT
Start: 2024-02-20 | End: 2024-02-20

## 2024-02-20 RX ORDER — HYDROCODONE BITARTRATE AND ACETAMINOPHEN 5; 325 MG/1; MG/1
1-2 TABLET ORAL EVERY 6 HOURS PRN
Status: DISCONTINUED | OUTPATIENT
Start: 2024-02-20 | End: 2024-02-20 | Stop reason: HOSPADM

## 2024-02-20 RX ORDER — LIDOCAINE 40 MG/G
CREAM TOPICAL
Status: DISCONTINUED | OUTPATIENT
Start: 2024-02-20 | End: 2024-02-20 | Stop reason: HOSPADM

## 2024-02-20 RX ORDER — NALOXONE HYDROCHLORIDE 0.4 MG/ML
0.2 INJECTION, SOLUTION INTRAMUSCULAR; INTRAVENOUS; SUBCUTANEOUS
Status: DISCONTINUED | OUTPATIENT
Start: 2024-02-20 | End: 2024-02-20 | Stop reason: HOSPADM

## 2024-02-20 RX ORDER — NALOXONE HYDROCHLORIDE 0.4 MG/ML
0.4 INJECTION, SOLUTION INTRAMUSCULAR; INTRAVENOUS; SUBCUTANEOUS
Status: DISCONTINUED | OUTPATIENT
Start: 2024-02-20 | End: 2024-02-20 | Stop reason: HOSPADM

## 2024-02-20 RX ORDER — FENTANYL CITRATE 50 UG/ML
50 INJECTION, SOLUTION INTRAMUSCULAR; INTRAVENOUS EVERY 5 MIN PRN
Status: DISCONTINUED | OUTPATIENT
Start: 2024-02-20 | End: 2024-02-20 | Stop reason: HOSPADM

## 2024-02-20 RX ORDER — CEFAZOLIN SODIUM/WATER 2 G/20 ML
2 SYRINGE (ML) INTRAVENOUS SEE ADMIN INSTRUCTIONS
Status: DISCONTINUED | OUTPATIENT
Start: 2024-02-20 | End: 2024-02-20 | Stop reason: HOSPADM

## 2024-02-20 RX ORDER — TOLTERODINE TARTRATE 2 MG/1
2 TABLET, EXTENDED RELEASE ORAL EVERY 12 HOURS PRN
Qty: 30 TABLET | Refills: 0 | Status: SHIPPED | OUTPATIENT
Start: 2024-02-20

## 2024-02-20 RX ORDER — DEXAMETHASONE SODIUM PHOSPHATE 4 MG/ML
INJECTION, SOLUTION INTRA-ARTICULAR; INTRALESIONAL; INTRAMUSCULAR; INTRAVENOUS; SOFT TISSUE PRN
Status: DISCONTINUED | OUTPATIENT
Start: 2024-02-20 | End: 2024-02-20

## 2024-02-20 RX ORDER — ONDANSETRON 2 MG/ML
4 INJECTION INTRAMUSCULAR; INTRAVENOUS EVERY 30 MIN PRN
Status: DISCONTINUED | OUTPATIENT
Start: 2024-02-20 | End: 2024-02-20 | Stop reason: HOSPADM

## 2024-02-20 RX ORDER — ONDANSETRON 4 MG/1
4 TABLET, ORALLY DISINTEGRATING ORAL EVERY 30 MIN PRN
Status: DISCONTINUED | OUTPATIENT
Start: 2024-02-20 | End: 2024-02-20 | Stop reason: HOSPADM

## 2024-02-20 RX ORDER — LIDOCAINE HYDROCHLORIDE 20 MG/ML
INJECTION, SOLUTION INFILTRATION; PERINEURAL PRN
Status: DISCONTINUED | OUTPATIENT
Start: 2024-02-20 | End: 2024-02-20

## 2024-02-20 RX ORDER — PROPOFOL 10 MG/ML
INJECTION, EMULSION INTRAVENOUS PRN
Status: DISCONTINUED | OUTPATIENT
Start: 2024-02-20 | End: 2024-02-20

## 2024-02-20 RX ORDER — HYDROMORPHONE HCL IN WATER/PF 6 MG/30 ML
0.2 PATIENT CONTROLLED ANALGESIA SYRINGE INTRAVENOUS EVERY 5 MIN PRN
Status: DISCONTINUED | OUTPATIENT
Start: 2024-02-20 | End: 2024-02-20 | Stop reason: HOSPADM

## 2024-02-20 RX ORDER — KETOROLAC TROMETHAMINE 30 MG/ML
INJECTION, SOLUTION INTRAMUSCULAR; INTRAVENOUS PRN
Status: DISCONTINUED | OUTPATIENT
Start: 2024-02-20 | End: 2024-02-20

## 2024-02-20 RX ORDER — HYDROMORPHONE HCL IN WATER/PF 6 MG/30 ML
0.4 PATIENT CONTROLLED ANALGESIA SYRINGE INTRAVENOUS EVERY 5 MIN PRN
Status: DISCONTINUED | OUTPATIENT
Start: 2024-02-20 | End: 2024-02-20 | Stop reason: HOSPADM

## 2024-02-20 RX ORDER — FENTANYL CITRATE 50 UG/ML
INJECTION, SOLUTION INTRAMUSCULAR; INTRAVENOUS PRN
Status: DISCONTINUED | OUTPATIENT
Start: 2024-02-20 | End: 2024-02-20

## 2024-02-20 RX ORDER — OXYCODONE HYDROCHLORIDE 5 MG/1
5 TABLET ORAL EVERY 6 HOURS PRN
Qty: 8 TABLET | Refills: 0 | Status: SHIPPED | OUTPATIENT
Start: 2024-02-20

## 2024-02-20 RX ORDER — FENTANYL CITRATE 50 UG/ML
25 INJECTION, SOLUTION INTRAMUSCULAR; INTRAVENOUS EVERY 5 MIN PRN
Status: DISCONTINUED | OUTPATIENT
Start: 2024-02-20 | End: 2024-02-20 | Stop reason: HOSPADM

## 2024-02-20 RX ORDER — SODIUM CHLORIDE, SODIUM LACTATE, POTASSIUM CHLORIDE, CALCIUM CHLORIDE 600; 310; 30; 20 MG/100ML; MG/100ML; MG/100ML; MG/100ML
INJECTION, SOLUTION INTRAVENOUS CONTINUOUS
Status: DISCONTINUED | OUTPATIENT
Start: 2024-02-20 | End: 2024-02-20 | Stop reason: HOSPADM

## 2024-02-20 RX ORDER — ACETAMINOPHEN 325 MG/1
975 TABLET ORAL ONCE
Status: COMPLETED | OUTPATIENT
Start: 2024-02-20 | End: 2024-02-20

## 2024-02-20 RX ORDER — CEFAZOLIN SODIUM/WATER 2 G/20 ML
2 SYRINGE (ML) INTRAVENOUS
Status: COMPLETED | OUTPATIENT
Start: 2024-02-20 | End: 2024-02-20

## 2024-02-20 RX ORDER — ONDANSETRON 2 MG/ML
INJECTION INTRAMUSCULAR; INTRAVENOUS PRN
Status: DISCONTINUED | OUTPATIENT
Start: 2024-02-20 | End: 2024-02-20

## 2024-02-20 RX ORDER — AMOXICILLIN 250 MG
1-2 CAPSULE ORAL 2 TIMES DAILY
Qty: 30 TABLET | Refills: 0 | Status: SHIPPED | OUTPATIENT
Start: 2024-02-20

## 2024-02-20 RX ADMIN — ONDANSETRON HYDROCHLORIDE 4 MG: 2 SOLUTION INTRAMUSCULAR; INTRAVENOUS at 11:58

## 2024-02-20 RX ADMIN — FENTANYL CITRATE 25 MCG: 50 INJECTION INTRAMUSCULAR; INTRAVENOUS at 11:57

## 2024-02-20 RX ADMIN — FENTANYL CITRATE 25 MCG: 50 INJECTION INTRAMUSCULAR; INTRAVENOUS at 12:50

## 2024-02-20 RX ADMIN — ACETAMINOPHEN 975 MG: 325 TABLET, FILM COATED ORAL at 10:37

## 2024-02-20 RX ADMIN — KETOROLAC TROMETHAMINE 30 MG: 30 INJECTION, SOLUTION INTRAMUSCULAR at 13:05

## 2024-02-20 RX ADMIN — FENTANYL CITRATE 25 MCG: 50 INJECTION INTRAMUSCULAR; INTRAVENOUS at 12:10

## 2024-02-20 RX ADMIN — FENTANYL CITRATE 25 MCG: 50 INJECTION INTRAMUSCULAR; INTRAVENOUS at 12:04

## 2024-02-20 RX ADMIN — PROPOFOL 50 MG: 10 INJECTION, EMULSION INTRAVENOUS at 12:26

## 2024-02-20 RX ADMIN — FENTANYL CITRATE 25 MCG: 50 INJECTION INTRAMUSCULAR; INTRAVENOUS at 12:35

## 2024-02-20 RX ADMIN — LIDOCAINE HYDROCHLORIDE 60 MG: 20 INJECTION, SOLUTION INFILTRATION; PERINEURAL at 11:58

## 2024-02-20 RX ADMIN — PROPOFOL 30 MG: 10 INJECTION, EMULSION INTRAVENOUS at 12:43

## 2024-02-20 RX ADMIN — PROPOFOL 160 MG: 10 INJECTION, EMULSION INTRAVENOUS at 11:58

## 2024-02-20 RX ADMIN — FENTANYL CITRATE 25 MCG: 50 INJECTION INTRAMUSCULAR; INTRAVENOUS at 12:54

## 2024-02-20 RX ADMIN — FENTANYL CITRATE 25 MCG: 50 INJECTION INTRAMUSCULAR; INTRAVENOUS at 12:12

## 2024-02-20 RX ADMIN — DEXAMETHASONE SODIUM PHOSPHATE 4 MG: 4 INJECTION, SOLUTION INTRA-ARTICULAR; INTRALESIONAL; INTRAMUSCULAR; INTRAVENOUS; SOFT TISSUE at 11:58

## 2024-02-20 RX ADMIN — SODIUM CHLORIDE, SODIUM LACTATE, POTASSIUM CHLORIDE, AND CALCIUM CHLORIDE 100 ML/HR: 600; 310; 30; 20 INJECTION, SOLUTION INTRAVENOUS at 10:50

## 2024-02-20 RX ADMIN — TOLTERODINE TARTRATE 2 MG: 2 TABLET, FILM COATED ORAL at 14:22

## 2024-02-20 RX ADMIN — PROPOFOL 40 MG: 10 INJECTION, EMULSION INTRAVENOUS at 12:20

## 2024-02-20 RX ADMIN — PROPOFOL 30 MG: 10 INJECTION, EMULSION INTRAVENOUS at 12:54

## 2024-02-20 RX ADMIN — Medication 2 G: at 11:15

## 2024-02-20 RX ADMIN — FENTANYL CITRATE 25 MCG: 50 INJECTION INTRAMUSCULAR; INTRAVENOUS at 12:37

## 2024-02-20 RX ADMIN — SODIUM CHLORIDE, SODIUM LACTATE, POTASSIUM CHLORIDE, AND CALCIUM CHLORIDE: 600; 310; 30; 20 INJECTION, SOLUTION INTRAVENOUS at 12:34

## 2024-02-20 RX ADMIN — PROPOFOL 20 MG: 10 INJECTION, EMULSION INTRAVENOUS at 12:50

## 2024-02-20 RX ADMIN — MIDAZOLAM HYDROCHLORIDE 2 MG: 1 INJECTION, SOLUTION INTRAMUSCULAR; INTRAVENOUS at 11:57

## 2024-02-20 ASSESSMENT — ACTIVITIES OF DAILY LIVING (ADL)
ADLS_ACUITY_SCORE: 29

## 2024-02-20 ASSESSMENT — LIFESTYLE VARIABLES: TOBACCO_USE: 1

## 2024-02-20 NOTE — OR NURSING
Patient has been discharged to home at 1445 via w/c accompanied by his father     Written discharge instructions were provided to patient and father.  Prescriptions were e-script. Patient states pain is 0/10 and denies any nausea or dizziness upon discharge.      Patient and adult caring for them verbalize understanding of discharge instructions including no driving until tomorrow and no longer taking narcotic pain medications - no operating mechanical equipment and no making any important decisions.They understand reason for discharge, and necessary follow-up appointments.       Stephanie Sagastume RN

## 2024-02-20 NOTE — OP NOTE
Operative report    Preoperative diagnosis: Right distal 1 cm obstructing ureteral stone    Postoperative diagnosis: Right distal 1 cm obstructing, embedded and impacted distal ureteral stone  with pan ureteral edema    Procedure: Cystoscopy, right stent removal, right retrograde pyelogram, right ureteroscopy, laser lithotripsy, basketing of stone fragments, right stent placement 6 x 24    Anesthesia type: General    Anesthesiologist: Cedric Alvarez    Surgeon:  Ramón Alfaro MD    Procedure description: Consent was obtained.  The risks were explained including bleeding, infection, ureteral injury and stricture, failure, stent discomfort, bladder injury as well as complications from the anesthesia itself.    We were doing the right side, films were reviewed and all were in agreement.    The patient was taken to the procedure room.  General anesthesia was given.  Antibiotics were given.  Timeout was done.  All were in agreement.  Films were on the board during the entire case.    Cystoscopy was done with a 30 degree lens and a 22 [place] cystoscope sheath.  Stent graspers were used to grab the stent and bring it at the tip of the meatus.    A 0.38 Glidewire was then advanced through the stent and the stent was offloaded.  An open-ended catheter was advanced over the wire into the collecting system and a retrograde was done.    System was dilated and there was severe edema pan ureteral from the proximal middle and distal and.    The wire was left in place.  The semirigid ureteroscope was then laced into the urethra and followed up to the bladder neck.  The right ureter was seen.  Using a second 0.35 sensor wire we were able to ride the rails and to advance to the stone.  The stone was impacted into the patient's right sidewall with a very difficult angle of attack.    We tried to dislodge the stone with her scope but were unsuccessful.  We therefore were required to use the thulium laser, 365  m to carefully break up  the stone.  We used our scope to push the fragments medially into view.    We were able to break up the stone and to use a basket to remove the fragments.  This did leave a pocket laterally in the ureter where the stone was.  There was no extravasation of contrast though.    We then advanced the scope proximally where the ureter was found to be severely edematous proximally, in the middle ureter and distally.  We injected contrast and there was no extravasation.    We proceeded to inspect the area where the stone was and no other fragments were seen.  A decision was made to leave the stent in place without a tether.    Through the cystoscope over the wire a 6 x 24 stent was placed with a curl noted in the kidney and in the bladder.    The bladder was drained and some fragments were removed and passed off.    The procedure was ended.        EBL: Minimal    Drains: 6X 24 JJ stent right    Complications: None    Specimens: Stone in the right ureter    Disposition: To recovery in good condition.  He will be dismissed home with follow-up in 2 to 3 weeks for stent removal.

## 2024-02-20 NOTE — ANESTHESIA CARE TRANSFER NOTE
Patient: Alexandr Guzmán    Procedure: Procedure(s):  Cystoureteroscopy, Right stent removal, right retrograde pyelogram, right ureteroscopy with lithotripsy using laser &right ureteral stent insertion       Diagnosis: Right ureteral calculus [N20.1]  Diagnosis Additional Information: No value filed.    Anesthesia Type:   General     Note:    Oropharynx: oropharynx clear of all foreign objects  Level of Consciousness: awake  Oxygen Supplementation: face mask  Level of Supplemental Oxygen (L/min / FiO2): 8  Independent Airway: airway patency satisfactory and stable  Dentition: dentition unchanged  Vital Signs Stable: post-procedure vital signs reviewed and stable  Report to RN Given: handoff report given  Patient transferred to: PACU    Handoff Report: Identifed the Patient, Identified the Reponsible Provider, Reviewed the pertinent medical history, Discussed the surgical course, Reviewed Intra-OP anesthesia mangement and issues during anesthesia, Set expectations for post-procedure period and Allowed opportunity for questions and acknowledgement of understanding  Vitals:  Vitals Value Taken Time   BP 91/54 02/20/24 1320   Temp     Pulse 91 02/20/24 1322   Resp 12 02/20/24 1322   SpO2 100 % 02/20/24 1322   Vitals shown include unfiled device data.    Electronically Signed By: DEYANIRA Lopez CRNA  February 20, 2024  1:24 PM

## 2024-02-20 NOTE — ANESTHESIA POSTPROCEDURE EVALUATION
Patient: Alexandr Guzmán    Procedure: Procedure(s):  Cystoureteroscopy, Right stent removal, right retrograde pyelogram, right ureteroscopy with lithotripsy using laser &right ureteral stent insertion       Anesthesia Type:  General    Note:  Disposition: Outpatient   Postop Pain Control: Uneventful            Sign Out: Well controlled pain   PONV: No   Neuro/Psych: Uneventful            Sign Out: Acceptable/Baseline neuro status   Airway/Respiratory: Uneventful            Sign Out: Acceptable/Baseline resp. status   CV/Hemodynamics: Uneventful            Sign Out: Acceptable CV status; No obvious hypovolemia; No obvious fluid overload   Other NRE: NONE   DID A NON-ROUTINE EVENT OCCUR? No           Last vitals:  Vitals Value Taken Time   BP 93/63 02/20/24 1355   Temp 97.7  F (36.5  C) 02/20/24 1345   Pulse 71 02/20/24 1355   Resp 12 02/20/24 1356   SpO2 97 % 02/20/24 1356   Vitals shown include unfiled device data.    Electronically Signed By: DEYANIRA HICKS CRNA  February 20, 2024  4:03 PM

## 2024-02-20 NOTE — OR NURSING
Patient was up to BR to void with small amt of bleeding. Dr. Alfaro here to visit with patient post op.

## 2024-02-20 NOTE — INTERVAL H&P NOTE
I have reviewed the surgical (or preoperative) H&P that is linked to this encounter, and examined the patient. There are no significant changes    PE:  Alert, NAD  CV:  RRR,  RESP:  CTA Bila  Psych:  Affect normal    Clinical Conditions Present on Arrival:  Clinically Significant Risk Factors Present on Admission

## 2024-02-20 NOTE — DISCHARGE INSTRUCTIONS
[facility]  Adult Discharge Orders & Instructions      For 24 hours after surgery:  Get plenty of rest.  A responsible adult must stay with you for at least 24 hours after you leave the hospital.   You may feel lightheaded.  IF so, sit for a few minutes before standing.  Have someone help you get up.   You may have a slight fever. Call the doctor if your fever is over 101 F (38.3 C) (taken under the tongue) or lasts longer than 24 hours.  You may have a dry mouth, a sore throat, muscle aches or trouble sleeping.  These should go away after 24 hours.  Do not make important or legal decisions.  6.   Do not drive or use heavy equipment.  If you have weakness or tingling, don't drive or use heavy equipment until this feeling goes away.                                                                                                                                                                         To contact a doctor, call    [phone]______________

## 2024-02-20 NOTE — ANESTHESIA PROCEDURE NOTES
Airway         Procedure Start/Stop Times: 2/20/2024 11:59 AM  Staff -        CRNA: Cedric Alvarez APRN CRNA       Performed By: CRNAIndications and Patient Condition       Indications for airway management: ileana-procedural       Induction type:intravenous       Mask difficulty assessment: 2 - vent by mask + OA or adjuvant +/- NMBA    Final Airway Details       Final airway type: supraglottic airway    Supraglottic Airway Details        Type: LMA       Brand: I-Gel       LMA size: 4    Post intubation assessment        Placement verified by: capnometry, equal breath sounds and chest rise        Number of attempts at approach: 1       Number of other approaches attempted: 0       Secured with: tape       Ease of procedure: easy       Dentition: Intact and Unchanged    Medication(s) Administered   Medication Administration Time: 2/20/2024 11:59 AM

## 2024-02-20 NOTE — ANESTHESIA PREPROCEDURE EVALUATION
Anesthesia Pre-Procedure Evaluation    Patient: Alexandr Guzmán   MRN: [id number] : 1990        Procedure : Procedure(s):  Cystoureteroscopy, Right stent removal, right retrograde pyelogram, right ureteroscopy with lithotripsy using laser & Possible right ureteral stent insertion          History reviewed. No pertinent past medical history.   Past Surgical History:   Procedure Laterality Date    LASER HOLMIUM LITHOTRIPSY URETER(S), INSERT STENT, COMBINED Right 2024    Procedure: CYSTOURETEROSCOPY, RIGHT RETROGRADE PYELOGRAM WITH BASKETING AND RIGHT URETERAL STENT INSERTION;  Surgeon: Ramón Alfaro MD;  Location: [facility] OR    OTHER SURGICAL HISTORY      75901.0,PAST SURGICAL HISTORY,None      Allergies   Allergen Reactions    Sulfa Antibiotics Unknown      Social History     Tobacco Use    Smoking status: Some Days     Types: Cigarettes    Smokeless tobacco: Never   Substance Use Topics    Alcohol use: Yes     Comment: occasional      Wt Readings from Last 1 Encounters:   24 59 kg (130 lb)        Anesthesia Evaluation   Pt has had prior anesthetic.     No history of anesthetic complications       ROS/MED HX  ENT/Pulmonary:     (+)                tobacco use, Current use,                       Neurologic:  - neg neurologic ROS     Cardiovascular:       METS/Exercise Tolerance: >4 METS    Hematologic:  - neg hematologic  ROS     Musculoskeletal:  - neg musculoskeletal ROS     GI/Hepatic:  - neg GI/hepatic ROS     Renal/Genitourinary:     (+)       Nephrolithiasis ,       Endo:  - neg endo ROS     Psychiatric/Substance Use:  - neg psychiatric ROS     Infectious Disease:  - neg infectious disease ROS     Malignancy:  - neg malignancy ROS     Other:  - neg other ROS          Physical Exam    Airway        Mallampati: I   TM distance: > 3 FB   Neck ROM: full   Mouth opening: > 3 cm    Respiratory Devices and Support         Dental       (+) Minor Abnormalities - some fillings, tiny  "chips      Cardiovascular   cardiovascular exam normal       Rhythm and rate: regular and normal     Pulmonary   pulmonary exam normal        breath sounds clear to auscultation           OUTSIDE LABS:  CBC:   Lab Results   Component Value Date    WBC 4.1 01/03/2024    HGB 13.5 01/03/2024    HCT 38.5 (L) 01/03/2024     01/03/2024     BMP:   Lab Results   Component Value Date     01/15/2024     01/03/2024    POTASSIUM 4.3 01/15/2024    POTASSIUM 4.2 01/03/2024    CHLORIDE 99 01/15/2024    CHLORIDE 102 01/03/2024    CO2 30 (H) 01/15/2024    CO2 28 01/03/2024    BUN 18.5 01/15/2024    BUN 18.7 01/03/2024    CR 1.09 01/15/2024    CR 1.02 01/03/2024    GLC 86 01/15/2024    GLC 92 01/03/2024     COAGS: No results found for: \"PTT\", \"INR\", \"FIBR\"  POC: No results found for: \"BGM\", \"HCG\", \"HCGS\"  HEPATIC: No results found for: \"ALBUMIN\", \"PROTTOTAL\", \"ALT\", \"AST\", \"GGT\", \"ALKPHOS\", \"BILITOTAL\", \"BILIDIRECT\", \"CYNTHIA\"  OTHER:   Lab Results   Component Value Date    LAILA 9.3 01/15/2024       Anesthesia Plan    ASA Status:  2    NPO Status:  NPO Appropriate    Anesthesia Type: General.     - Airway: LMA   Induction: Intravenous.   Maintenance: Balanced.        Consents    Anesthesia Plan(s) and associated risks, benefits, and realistic alternatives discussed. Questions answered and patient/representative(s) expressed understanding.     - Discussed: Risks, Benefits and Alternatives for BOTH SEDATION and the PROCEDURE were discussed     - Discussed with:  Patient            Postoperative Care            Comments:               DEYANIRA HICKS CRNA    I have reviewed the pertinent notes and labs in the chart from the past 30 days and (re)examined the patient.  Any updates or changes from those notes are reflected in this note.                  "

## 2024-02-20 NOTE — OR NURSING
PACU Transfer Note    Alexandr Guzmán was transferred to [facility] via cart.  Equipment used for transport:  none.  Accompanied by:  RN  Prescriptions were: Er    PACU Respiratory Event Documentation     1) Episodes of Apnea greater than or equal to 10 seconds: no    2) Bradypnea - less than 8 breaths per minute: no    3) Pain score on 0 to 10 scale: 0    4) Pain-sedation mismatch (yes or no): no    5) Repeated 02 desaturation less than 90% (yes or no): no    Anesthesia notified? (yes or no): no    Report to Stephanie    Any of the above events occuring repeatedly in separate 30 minute intervals may be considered recurrent PACU respiratory events.    Patient stable and meets phase 1 discharge criteria for transport from PACU.

## 2024-02-25 LAB
APPEARANCE STONE: NORMAL
COMPN STONE: NORMAL
SPECIMEN WT: 5 MG

## 2024-03-08 DIAGNOSIS — Z98.890 HISTORY OF REMOVAL OF URETERAL STENT: Primary | ICD-10-CM

## 2024-03-13 ENCOUNTER — OFFICE VISIT (OUTPATIENT)
Dept: UROLOGY | Facility: OTHER | Age: 34
End: 2024-03-13
Attending: UROLOGY

## 2024-03-13 DIAGNOSIS — N20.1 RIGHT DISTAL URETERAL CALCULUS: Primary | ICD-10-CM

## 2024-03-13 LAB
CALCIUM SERPL-MCNC: 9.6 MG/DL (ref 8.6–10)
PHOSPHATE SERPL-MCNC: 3.2 MG/DL (ref 2.5–4.5)
PTH-INTACT SERPL-MCNC: 14 PG/ML (ref 15–65)
URATE SERPL-MCNC: 6.2 MG/DL (ref 3.4–7)

## 2024-03-13 PROCEDURE — 82310 ASSAY OF CALCIUM: CPT | Mod: ZL | Performed by: UROLOGY

## 2024-03-13 PROCEDURE — 36415 COLL VENOUS BLD VENIPUNCTURE: CPT | Mod: ZL | Performed by: UROLOGY

## 2024-03-13 PROCEDURE — 84100 ASSAY OF PHOSPHORUS: CPT | Mod: ZL | Performed by: UROLOGY

## 2024-03-13 PROCEDURE — 52310 CYSTOSCOPY AND TREATMENT: CPT | Performed by: UROLOGY

## 2024-03-13 PROCEDURE — 84550 ASSAY OF BLOOD/URIC ACID: CPT | Mod: ZL | Performed by: UROLOGY

## 2024-03-13 PROCEDURE — 99212 OFFICE O/P EST SF 10 MIN: CPT | Mod: 25 | Performed by: UROLOGY

## 2024-03-13 PROCEDURE — 83970 ASSAY OF PARATHORMONE: CPT | Mod: ZL | Performed by: UROLOGY

## 2024-03-13 PROCEDURE — 250N000013 HC RX MED GY IP 250 OP 250 PS 637: Performed by: UROLOGY

## 2024-03-13 PROCEDURE — 250N000009 HC RX 250: Performed by: UROLOGY

## 2024-03-13 RX ORDER — LIDOCAINE HYDROCHLORIDE 20 MG/ML
JELLY TOPICAL ONCE
Status: COMPLETED | OUTPATIENT
Start: 2024-03-13 | End: 2024-03-13

## 2024-03-13 RX ADMIN — CEPHALEXIN 250 MG: 250 CAPSULE ORAL at 10:16

## 2024-03-13 RX ADMIN — LIDOCAINE HYDROCHLORIDE: 20 JELLY TOPICAL at 16:54

## 2024-03-13 NOTE — PATIENT INSTRUCTIONS
Follow-up in 6 to 8 weeks with renal ultrasound to ensure that everything is healed nicely.    Stone prevention discussed includin. Decreased salt intake:  No more than 2000 mg/day    2. Decreased animal protein including chicken, beef, pork, fish and wild game.     3. Increased fluid consumption enough to maintain > 2 Liters urine output/day.     4. Normal calcium diet and intake.     5. Increased fruits and vegetables.    Stone work up including 24-hour urine, renal panel (calcium), PTH , Phorphorus and uric acid level will be done..    All questions addressed.

## 2024-03-13 NOTE — NURSING NOTE
Patient positioned in supine position, perineum area prepped with chlorhexidene Gluconate, betadine swabs, patient draped per sterile technique. Per verbal order read back by Ramón Alfaro MD, Urojet 10mL 2% lidocaine jelly to be instilled into urethra.      Universal Protocol    A. Pre-procedure verification complete Yes  1-relevant information / documentation available, reviewed and properly matched to the patient; 2-consent accurate and complete, 3-equipment and supplies available    B. Site marking complete N/A  Site marked if not in continuous attendance with patient    C. TIME OUT completed Yes  Time Out was conducted just prior to starting procedure to verify the eight required elements: 1-patient identity, 2-consent accurate and complete, 3-position, 4-correct side/site marked (if applicable), 5-procedure, 6-relevant images / results properly labeled and displayed (if applicable), 7-antibiotics / irrigation fluids (if applicable), 8-safety precautions.    After procedure perineum area rinsed. Discharge instructions reviewed with patient. Patient verbalized understanding of discharge instructions and discharged ambulatory.  Lulu Cohen RN..................3/13/2024  8:59 AM

## 2024-03-13 NOTE — PROGRESS NOTES
33-year-old male with a complicated right distal ureteral calculus impacting the right ureteral wall and literally embedded which required initial stent placement and eventual right ureteroscopy.    This was done on 2/20/2024.    Here today for right stent removal.  I anticipate a right renal ultrasound in 6 weeks to ensure that the system is healed and draining well.  I am concerned about possible stricture disease.    Denies any problems today.    Male Cystoscopy Procedure Note with stent removal   PRE-PROCEDURE DIAGNOSIS: Right distal impacted ureteral calculus  POST-PROCEDURE DIAGNOSIS: Same  PROCEDURE: cystoscopy with right stent removal    RISKS DISCUSSED:  Bleeding, infection, urethral stricture, irritative voiding symptoms, retention of urine.      DESCRIPTION OF PROCEDURE:  After informed consent was obtained, the patient was brought to the procedure room where he was placed in the supine position with all pressure points well padded.  The penis and scrotum were prepped and draped in a sterile fashion. A flexible cystoscope was introduced through a well-lubricated urethra.      FINDINGS:    Meatus: normal  Urethra: Normal without stricture  Prostate: Nonobstructive open  Bladder Neck: Open  Bladder: No visible tumor, stones or lesions, right stent visible with typical stent cystitis  trigone:  Normal ureters, normal efflux    The flexible cystoscope was removed and the findings were described to the patient.     FINDINGS: Right stent removal    Assessment:  Right impacted distal ureteral calculus status post stent removal    Plan:  Renal ultrasound in 6 weeks.  Full 24-hour urine with blood work.    Cystoscopy was 5 minutes of today's visit.  Total visit time was 15 minutes.

## 2024-03-13 NOTE — RESULT ENCOUNTER NOTE
Normal calcium, uric acid and parathyroid levels.  Phosphorus normal.  Await PTH levels.  Dr. Alfaro

## 2024-03-14 NOTE — RESULT ENCOUNTER NOTE
Alexandr, parathyroid level and the previous labs that we did are normal.  We will just need your 24-hour urine which is the kit that my nurse gave you.  When she get that done I will let you know my recommendations.  Dr. Alfaro

## 2024-03-17 ENCOUNTER — HEALTH MAINTENANCE LETTER (OUTPATIENT)
Age: 34
End: 2024-03-17

## 2024-04-01 ENCOUNTER — TELEPHONE (OUTPATIENT)
Dept: UROLOGY | Facility: OTHER | Age: 34
End: 2024-04-01

## 2024-04-01 NOTE — TELEPHONE ENCOUNTER
I have attempted to contact this patient by phone with the following: Call back to remind him to complete his 24 hr Urine with [facility]. He was given a container and instructions, orders are in and all he needs to do is drop off at lab. My chart message sent to patient to complete. Lulu Cohen RN on 4/1/2024 at 1:29 PM

## 2025-01-08 NOTE — PROGRESS NOTES
I have left multiple messages with the patient and the patient's father about follow-up with me following his stone surgery.  High risk for ureteral stricture given the complexity of the stone.  I recommended imaging with an ultrasound to ensure that there is no further hydronephrosis.    He has failed to return my calls and his father was not able to assist with me reaching him either.    Dr. Alfaro

## 2025-03-22 ENCOUNTER — HEALTH MAINTENANCE LETTER (OUTPATIENT)
Age: 35
End: 2025-03-22

## (undated) DEVICE — SUCTION MANIFOLD NEPTUNE 2 SYS 4 PORT 0702-020-000

## (undated) DEVICE — SLEEVE COMPRESSION SCD KNEE MED 74022

## (undated) DEVICE — PACK CYSTO SBA15CSFCA

## (undated) DEVICE — SPONGE RAY-TEC 4X4" 7317

## (undated) DEVICE — GW UROSTREAM HYDROPHILIC NITINOL TUNGSTEN G59155

## (undated) DEVICE — Device

## (undated) DEVICE — GLOVE PROTEXIS POWDER FREE SMT 7.5  2D72PT75X

## (undated) DEVICE — SOL WATER 1500ML

## (undated) DEVICE — CATH URETERAL DL 6FR FLEX-TIP 10FRX50CM G17323 AQ-022610

## (undated) DEVICE — BASKET NITINOL TIPLESS HALO  1.5FRX120CM 554120

## (undated) DEVICE — PAD CHUX UNDERPAD 30X36" P3036C

## (undated) RX ORDER — TOLTERODINE TARTRATE 2 MG/1
TABLET, EXTENDED RELEASE ORAL
Status: DISPENSED
Start: 2024-02-20

## (undated) RX ORDER — DEXAMETHASONE SODIUM PHOSPHATE 4 MG/ML
INJECTION, SOLUTION INTRA-ARTICULAR; INTRALESIONAL; INTRAMUSCULAR; INTRAVENOUS; SOFT TISSUE
Status: DISPENSED
Start: 2024-01-22

## (undated) RX ORDER — ONDANSETRON 2 MG/ML
INJECTION INTRAMUSCULAR; INTRAVENOUS
Status: DISPENSED
Start: 2024-02-20

## (undated) RX ORDER — CEFAZOLIN SODIUM/WATER 2 G/20 ML
SYRINGE (ML) INTRAVENOUS
Status: DISPENSED
Start: 2024-02-20

## (undated) RX ORDER — CEFAZOLIN SODIUM/WATER 2 G/20 ML
SYRINGE (ML) INTRAVENOUS
Status: DISPENSED
Start: 2024-01-22

## (undated) RX ORDER — KETOROLAC TROMETHAMINE 30 MG/ML
INJECTION, SOLUTION INTRAMUSCULAR; INTRAVENOUS
Status: DISPENSED
Start: 2024-01-22

## (undated) RX ORDER — ACETAMINOPHEN 325 MG/1
TABLET ORAL
Status: DISPENSED
Start: 2024-02-20

## (undated) RX ORDER — DEXMEDETOMIDINE HYDROCHLORIDE 4 UG/ML
INJECTION, SOLUTION INTRAVENOUS
Status: DISPENSED
Start: 2024-01-22

## (undated) RX ORDER — KETOROLAC TROMETHAMINE 30 MG/ML
INJECTION, SOLUTION INTRAMUSCULAR; INTRAVENOUS
Status: DISPENSED
Start: 2024-02-20

## (undated) RX ORDER — PROPOFOL 10 MG/ML
INJECTION, EMULSION INTRAVENOUS
Status: DISPENSED
Start: 2024-01-22

## (undated) RX ORDER — SODIUM CHLORIDE, SODIUM LACTATE, POTASSIUM CHLORIDE, CALCIUM CHLORIDE 600; 310; 30; 20 MG/100ML; MG/100ML; MG/100ML; MG/100ML
INJECTION, SOLUTION INTRAVENOUS
Status: DISPENSED
Start: 2024-02-20

## (undated) RX ORDER — SODIUM CHLORIDE, SODIUM LACTATE, POTASSIUM CHLORIDE, CALCIUM CHLORIDE 600; 310; 30; 20 MG/100ML; MG/100ML; MG/100ML; MG/100ML
INJECTION, SOLUTION INTRAVENOUS
Status: DISPENSED
Start: 2024-01-22

## (undated) RX ORDER — ACETAMINOPHEN 325 MG/1
TABLET ORAL
Status: DISPENSED
Start: 2024-01-22

## (undated) RX ORDER — GLYCOPYRROLATE 0.2 MG/ML
INJECTION, SOLUTION INTRAMUSCULAR; INTRAVENOUS
Status: DISPENSED
Start: 2024-01-22

## (undated) RX ORDER — FENTANYL CITRATE 50 UG/ML
INJECTION, SOLUTION INTRAMUSCULAR; INTRAVENOUS
Status: DISPENSED
Start: 2024-02-20

## (undated) RX ORDER — FENTANYL CITRATE 50 UG/ML
INJECTION, SOLUTION INTRAMUSCULAR; INTRAVENOUS
Status: DISPENSED
Start: 2024-01-22

## (undated) RX ORDER — DEXAMETHASONE SODIUM PHOSPHATE 4 MG/ML
INJECTION, SOLUTION INTRA-ARTICULAR; INTRALESIONAL; INTRAMUSCULAR; INTRAVENOUS; SOFT TISSUE
Status: DISPENSED
Start: 2024-02-20

## (undated) RX ORDER — ONDANSETRON 2 MG/ML
INJECTION INTRAMUSCULAR; INTRAVENOUS
Status: DISPENSED
Start: 2024-01-22